# Patient Record
Sex: FEMALE | Race: WHITE | NOT HISPANIC OR LATINO | Employment: FULL TIME | URBAN - METROPOLITAN AREA
[De-identification: names, ages, dates, MRNs, and addresses within clinical notes are randomized per-mention and may not be internally consistent; named-entity substitution may affect disease eponyms.]

---

## 2023-06-05 ENCOUNTER — LAB (OUTPATIENT)
Dept: LAB | Facility: LAB | Age: 38
End: 2023-06-05

## 2023-06-06 LAB — TOBACCO SCREEN, URINE: NEGATIVE

## 2023-08-24 LAB
GENETICS TEST NAME-DATA CONVERSION: NORMAL
LAB MOLECULAR CA TECHNICAL NOTES: NORMAL

## 2023-11-01 PROBLEM — H91.92 HEARING LOSS OF LEFT EAR: Status: ACTIVE | Noted: 2019-02-24

## 2023-11-01 PROBLEM — G43.019 REFRACTORY MIGRAINE WITHOUT AURA: Status: ACTIVE | Noted: 2021-07-20

## 2023-11-01 PROBLEM — F43.9 REACTION TO SEVERE STRESS, UNSPECIFIED: Status: ACTIVE | Noted: 2021-07-20

## 2023-11-01 PROBLEM — J32.9 CHRONIC SINUSITIS: Status: ACTIVE | Noted: 2022-02-07

## 2023-11-01 PROBLEM — F41.9 ANXIETY DISORDER, UNSPECIFIED: Status: ACTIVE | Noted: 2017-04-19

## 2023-11-01 PROBLEM — H47.10 PAPILLEDEMA OF BOTH EYES: Status: ACTIVE | Noted: 2023-11-01

## 2023-11-01 PROBLEM — G43.009 COMMON MIGRAINE WITHOUT AURA: Status: ACTIVE | Noted: 2023-11-01

## 2023-11-01 PROBLEM — E89.40 POSTABLATIVE OVARIAN FAILURE: Status: ACTIVE | Noted: 2023-11-01

## 2023-11-01 PROBLEM — R41.840 INATTENTION: Status: ACTIVE | Noted: 2021-07-20

## 2023-11-01 PROBLEM — F41.1 GAD (GENERALIZED ANXIETY DISORDER): Status: ACTIVE | Noted: 2021-06-03

## 2023-11-01 PROBLEM — E78.5 HYPERLIPIDEMIA: Status: ACTIVE | Noted: 2023-05-01

## 2023-11-01 PROBLEM — H47.333 PSEUDOPAPILLEDEMA OF BOTH OPTIC DISCS: Status: ACTIVE | Noted: 2022-02-07

## 2023-11-01 PROBLEM — F90.0 ADHD (ATTENTION DEFICIT HYPERACTIVITY DISORDER), INATTENTIVE TYPE: Status: ACTIVE | Noted: 2022-02-07

## 2023-11-01 PROBLEM — E55.9 VITAMIN D DEFICIENCY: Status: ACTIVE | Noted: 2023-11-01

## 2023-11-01 RX ORDER — LISDEXAMFETAMINE DIMESYLATE CAPSULES 10 MG/1
1 CAPSULE ORAL DAILY
COMMUNITY
Start: 2023-05-15

## 2023-11-01 RX ORDER — TOPIRAMATE 50 MG/1
1-2 TABLET, FILM COATED ORAL 2 TIMES DAILY
COMMUNITY
End: 2023-11-02 | Stop reason: WASHOUT

## 2023-11-01 RX ORDER — ACETAZOLAMIDE 250 MG/1
250 TABLET ORAL 2 TIMES DAILY
COMMUNITY
Start: 2022-12-31 | End: 2023-11-02 | Stop reason: WASHOUT

## 2023-11-01 RX ORDER — TALC
3-9 POWDER (GRAM) TOPICAL NIGHTLY PRN
COMMUNITY
Start: 2023-05-15

## 2023-11-01 RX ORDER — ASPIRIN 325 MG
50000 TABLET, DELAYED RELEASE (ENTERIC COATED) ORAL
COMMUNITY
Start: 2022-11-12 | End: 2023-11-02 | Stop reason: WASHOUT

## 2023-11-01 RX ORDER — RIMEGEPANT SULFATE 75 MG/75MG
TABLET, ORALLY DISINTEGRATING ORAL
COMMUNITY
Start: 2023-04-07

## 2023-11-01 RX ORDER — BUSPIRONE HYDROCHLORIDE 7.5 MG/1
7.5 TABLET ORAL 2 TIMES DAILY
COMMUNITY
End: 2023-11-02 | Stop reason: WASHOUT

## 2023-11-01 RX ORDER — LISDEXAMFETAMINE DIMESYLATE 30 MG/1
1 CAPSULE ORAL EVERY MORNING
COMMUNITY
Start: 2022-11-14

## 2023-11-01 RX ORDER — ERGOCALCIFEROL 1.25 MG/1
1 CAPSULE ORAL
COMMUNITY
Start: 2023-05-04

## 2023-11-01 RX ORDER — FLUTICASONE PROPIONATE 50 MCG
1 SPRAY, SUSPENSION (ML) NASAL DAILY
COMMUNITY
Start: 2022-11-09 | End: 2023-11-02 | Stop reason: WASHOUT

## 2023-11-01 RX ORDER — ESTRADIOL 0.5 MG/.5G
GEL TOPICAL
COMMUNITY
Start: 2022-11-07 | End: 2023-11-02 | Stop reason: WASHOUT

## 2023-11-01 RX ORDER — TOPIRAMATE 100 MG/1
100 TABLET, FILM COATED ORAL DAILY
COMMUNITY
Start: 2021-06-01 | End: 2023-11-02 | Stop reason: WASHOUT

## 2023-11-01 RX ORDER — BUSPIRONE HYDROCHLORIDE 15 MG/1
15 TABLET ORAL 2 TIMES DAILY
COMMUNITY
Start: 2023-10-02

## 2023-11-01 RX ORDER — RIZATRIPTAN BENZOATE 10 MG/1
10 TABLET ORAL
COMMUNITY
Start: 2021-06-01

## 2023-11-01 RX ORDER — BUSPIRONE HYDROCHLORIDE 10 MG/1
10 TABLET ORAL 2 TIMES DAILY
COMMUNITY
Start: 2023-05-15 | End: 2023-11-02 | Stop reason: WASHOUT

## 2023-11-01 RX ORDER — ATOMOXETINE 40 MG/1
40 CAPSULE ORAL EVERY MORNING
COMMUNITY
End: 2023-11-02 | Stop reason: WASHOUT

## 2023-11-02 ENCOUNTER — TELEMEDICINE CLINICAL SUPPORT (OUTPATIENT)
Dept: GENETICS | Facility: CLINIC | Age: 38
End: 2023-11-02
Payer: COMMERCIAL

## 2023-11-02 DIAGNOSIS — Z13.71 ENCOUNTER FOR NONPROCREATIVE GENETIC COUNSELING AND TESTING: Primary | ICD-10-CM

## 2023-11-02 DIAGNOSIS — Z71.83 ENCOUNTER FOR NONPROCREATIVE GENETIC COUNSELING AND TESTING: Primary | ICD-10-CM

## 2023-11-02 DIAGNOSIS — Z80.41 FAMILY HISTORY OF OVARIAN CANCER: ICD-10-CM

## 2023-11-02 DIAGNOSIS — Z80.3 FAMILY HISTORY OF BREAST CANCER: ICD-10-CM

## 2023-11-02 PROCEDURE — 96040 HC GENETIC COUNSELING, EACH 30 MIN: CPT | Performed by: GENETIC COUNSELOR, MS

## 2023-11-02 PROCEDURE — 96040 PR MEDICAL GENETICS COUNSELING EACH 30 MINUTES: CPT | Performed by: GENETIC COUNSELOR, MS

## 2023-11-02 RX ORDER — SUMATRIPTAN 50 MG/1
TABLET, FILM COATED ORAL
COMMUNITY
Start: 2018-06-22

## 2023-11-02 RX ORDER — TRAZODONE HYDROCHLORIDE 50 MG/1
50 TABLET ORAL NIGHTLY
COMMUNITY

## 2023-11-02 NOTE — PROGRESS NOTES
History of Present Illness:  Izabela Nunes  is a 27 y.o. female with a maternal family history of breast cancer. Ms. Nunes was self-referred to the Cancer Genetics Clinic at [facility] at the suggestion of her daughter's medical geneticist. Ms. Nunes is interested in genetic testing to clarify their personal risk for cancer, as well as the risks to their family members.    Cancer Medical History:  Personal history of cancer? No.    Prior genetic testing? Parental comparison testing as part of [facility] for her child. No hereditary cancer testing.    Cancer screening history:  Mammograms? Yes, 2023.  Breast MRI? Yes, 2023.  PAP smear? Yes, in the past, but not since TH-BSO. No known history of abnormal Pap smears.  Colonoscopy? Unsure. Had procedure involving bowel prep and contrast prior to hysterectomy.   Upper endoscopy? No.  Dermatology? No.   Other cancer screening? No.    Reproductive History:  Number of children: 4.  Number of pregnancies: 8.   Age first birth: 19.  Breast feeding? Yes.  Menarche (age): 8.  Menopause (age): s/p TH-BSO in .  OCP: Yes, for ~9 years. Also used patch for ~9 months  HRT: Yes, for ~3 years. Has also used patch/gel//cream.    Hysterectomy? Yes, in 2016 per patient. S/p TH-BSO due to failed endometrial ablation. History of tubal ligation in , but in , was experiencing heavy bleeding. 2016 underwent endometrial ablation.  Oophorectomy? Yes.    Family history:  A 4-generation pedigree was obtained and was significant for the following:  -Patient, no history of cancer;  -Mother,  at 52 due to breast cancer (diagnosed at 51);  -Maternal aunt, 71, with breast cancer (diagnosed at 68);  -Maternal grandmother,  at 72, with breast cancer (diagnosed at 63);  -Maternal great-uncle,  70s, bladder cancer diagnosed in 50s, non-smoker;  -Maternal great-aunt,  late 50s/early 60s due to breast and ovarian cancer (diagnosed in  50s);  -Paternal great-aunt,  in 40s, due to cancer NOS (diagnosed in 40s);  -Of note, patient's daughter has a neurodevelopmental disorder due to a VAMP2 gene mutation, per the patient.    Ms. Nunes is of Japanese/Montenegrin (maternal) & Danish (paternal) ancestry. There is no known Ashkenazi Christianity ancestry or consanguinity.      Genetic counseling:  Ms. Nunes is a 37 y.o.-year-old female with a maternal family history of breast & ovarian cancer concerning for hereditary cancer.  This could be BRCA1 or BRCA2-related hereditary breast and ovarian cancer (HBOC), or hereditary breast cancer due to a different gene mutation, such as PALB2.  Ms. Nunes meets current national (NCCN) criteria for testing of high-penetrance breast cancer susceptibility genes, including BRCA1, BRCA2, CDH1, PALB2, PTEN, and TP53.  Testing is medically necessary, as it will help determine if Ms. Nunes is a candidate for continued high-risk breast care (yearly mammograms with yearly breast MRIs), as well as other interventions.    We reviewed genes and chromosomes, inherited forms of breast and ovarian cancer, and the BRCA1 and BRCA2 genes causing HBOC.  We discussed that most cancers are not due to an inherited genetic susceptibility.  However, in about 5-10% of families, there is an inherited genetic mutation that can make a person more susceptible to developing certain forms of cancer.  Within these families, we often see multiple family members with cancer, occurring in multiple generations.  In addition, earlier onset and bilateral cancers are suggestive of an inherited form of cancer.  Finally, there is a clustering of certain types of cancer in these families, such as breast and ovarian cancer.    We discussed the BRCA1 and BRCA2 genes, which are two genes that have been linked to early-onset breast and/or ovarian cancer.  Mutations in these genes are inherited in a dominant pattern and confer up to an 87% lifetime risk for breast  cancer.  This is elevated compared to the general population risk of 10-12%.  In addition, BRCA1 and BRCA2 mutation carriers have up to a 45% lifetime risk for ovarian cancer, which is elevated over the 2% general population risk.  Mutation carriers who have already been diagnosed with cancer have an increased risk to develop a second, contralateral breast cancer.  BRCA2 gene mutation carriers have an increased risk for male breast cancer, prostate cancer, melanoma, gastric cancer, and pancreatic cancer.    We briefly discussed that there are multiple genes associated with increased breast and/or ovarian cancer risk. Some genes, like the BRCA genes are considered highly penetrant breast and ovarian cancer genes, meaning a mutation in the gene confers a high risk of breast and/or ovarian cancer. On the other hand, there are other intermediate (moderate risk) breast and ovarian cancer genes. For many of the moderate risk genes, there is sometimes limited information regarding the degree to which a mutation in the gene affects risk of different types of cancers. Additionally, for some of these moderate risk genes, the appropriate management for individuals who have a mutation in one of these genes is not always clear. In many cases, even if an individual tests positive for a mutation in a moderate risk gene, recommendations are still based on the family history, not the positive test result.    Ms. Nunes was counseled about hereditary cancer susceptibility including cancer risks, options for increased screening and/or risk reduction, genetic testing, and the implications for other family members.  We discussed performing testing for high-penetrance breast cancer susceptibility genes, ideally as part of a multi-gene panel.  We specifically discussed the [facility] Common Hereditary Cancers panel, which examines the following 47 genes:  APC, AFUA, AXIN2, BARD1, BMPR1A, BRCA1, BRCA2, BRIP1, CDH1, CDK4, CDKN2A, CHEK2, CTNNA1,  DICER1, EPCAM, GREM1, HOXB13, KIT, MEN1, MLH1, MSH2, MSH3, MSH6, MUTYH, NBN, NF1, NTHL1, PALB2, PDGFRA, PMS2, POLD1, POLE, PTEN, RAD50, RAD51C, RAD51D, SDHA, SDHB, SDHC, SDHD, SMAD4, SMARCA4, STK11, TP53, TSC1, TSC2, and VHL.    We discussed the Genetic Information Nondiscrimination Act (MAC). We discussed the protections and limitations of MAC. MAC generally makes in illegal for health insurance companies, group health plans, and most employers (with >15 employees) to discriminate based on genetic information. It does not protect against genetic discrimination for life insurance, disability insurance, long-term care, or other insurances.    After a discussion about the risks, benefits, and limitations of genetic testing,  Ms. Nunes elected to undergo genetic testing for hereditary cancer using the [facility] panel described above.  She gave her oral consent to proceed with testing.  She plans to have her blood drawn for testing in th near future. Results are typically available within 3-4 weeks from the time of blood draw, and Ms. Nunes will return to the Cancer Genetics Clinic to discuss her testing results.  At that time, we will make recommendations for both Ms. Nunes and her family members in terms of cancer screening and/or cancer risk reduction options.    PLAN:  1. Ms. Nunes elected to undergo genetic testing for hereditary cancer using the [facility] panel described above.  She gave her oral consent to proceed with testing.  She plans to have her blood drawn for testing in th near future. Results are typically available within 3-4 weeks from the time of blood draw.    2.  Ms. Nunes will return to the Cancer Genetics Clinic to discuss her test results.  A follow up appointment has been scheduled for Monday 11/27 at 4 pm, virtual visit.    3. We remain available to Ms. Nunes at [phone] if any questions arise regarding information discussed at today's visit.    Marla Carmichael MS, [facility]  Genetic  Counselor  [facility]  [phone]    Reviewed by:  Chante Rosales MD  Clinical   [facility]  [phone]    Patient seen in conjunction with genetic counseling intern, Jess Phelan.    Time spent with patient:  65 minutes (2:45-3:50 pm)

## 2023-11-02 NOTE — Clinical Note
Please add virtual follow-up to Epic for Monday 11/27 at 4 pm. You will need to open a slot. Thank you!

## 2023-11-06 ENCOUNTER — LAB (OUTPATIENT)
Dept: LAB | Facility: LAB | Age: 38
End: 2023-11-06
Payer: COMMERCIAL

## 2023-11-06 DIAGNOSIS — Z80.41 FAMILY HISTORY OF OVARIAN CANCER: ICD-10-CM

## 2023-11-06 DIAGNOSIS — Z80.3 FAMILY HISTORY OF BREAST CANCER: ICD-10-CM

## 2023-11-06 PROCEDURE — 36415 COLL VENOUS BLD VENIPUNCTURE: CPT

## 2023-11-17 LAB — SCAN RESULT: NORMAL

## 2023-11-27 ENCOUNTER — TELEMEDICINE CLINICAL SUPPORT (OUTPATIENT)
Dept: GENETICS | Facility: CLINIC | Age: 38
End: 2023-11-27
Payer: COMMERCIAL

## 2023-11-27 DIAGNOSIS — Z13.71 BRCA GENE MUTATION NEGATIVE: ICD-10-CM

## 2023-11-27 DIAGNOSIS — Z71.83 ENCOUNTER FOR NONPROCREATIVE GENETIC COUNSELING AND TESTING: ICD-10-CM

## 2023-11-27 DIAGNOSIS — Z13.71 ENCOUNTER FOR NONPROCREATIVE GENETIC COUNSELING AND TESTING: ICD-10-CM

## 2023-11-27 DIAGNOSIS — Z80.3 FAMILY HISTORY OF BREAST CANCER: ICD-10-CM

## 2023-11-27 DIAGNOSIS — Z80.41 FAMILY HISTORY OF OVARIAN CANCER: ICD-10-CM

## 2023-11-27 PROCEDURE — 96040 PR MEDICAL GENETICS COUNSELING EACH 30 MINUTES: CPT | Performed by: GENETIC COUNSELOR, MS

## 2023-11-27 NOTE — PROGRESS NOTES
History of Present Illness:  Izabela Nunes  is a 27 y.o. female with a maternal family history of breast cancer. She was last seen in the Cancer Genetics Clinic on 2023, at which time she chose to pursue testing for hereditary cancer as part of the 47-gene [facility] Common Hereditary Cancers panel. These results returned, and are NEGATIVE (normal). They were reviewed with Ms. Nunes today, and she was provided with a copy of her results via the online [employer] portal, as well as [employer].    Family history (as previously noted):  -Patient, no history of cancer;  -Mother,  at 52 due to breast cancer (diagnosed at 51);  -Maternal aunt, 71, with breast cancer (diagnosed at 68);  -Maternal grandmother,  at 72, with breast cancer (diagnosed at 63);  -Maternal great-uncle,  70s, bladder cancer diagnosed in 50s, non-smoker;  -Maternal great-aunt,  late 50s/early 60s due to breast and ovarian cancer (diagnosed in 50s);  -Paternal great-aunt,  in 40s, due to cancer NOS (diagnosed in 40s);  -Of note, patient's daughter has a neurodevelopmental disorder due to a VAMP2 gene mutation, per the patient.     Ms. Nunes is of Serbian/Eva (maternal) & Kazakh (paternal) ancestry. There is no known Ashkenazi Christian ancestry or consanguinity.     Genetic test results: Negative 47-gene Common Hereditary Cancers panel from [employer], report date 2024. See results at bottom of encounter as well as attached to visit.    Genetic counseling:  Ms. Nunes's results were NEGATIVE, meaning that no disease causing mutations were identified. A genetic cause for her family history of cancer has not been identified.    One reason Ms. Nunes underwent genetic testing was to better understand her risk to develop breast cancer to help determine if further breast screening or surgery is indicated. Because their results were negative, Ms. Nunes does not have an identifiable genetic risk factor that  "places her at an increased risk to develop breast cancer. However, based on her family history, she may still at increased risk for breast cancer (with heterogeneously dense breasts per 5/26/2023 mammogram), though she has multiple mitigating factors (early menopause [31], early age at first birth [20]) that may help reduce her overall risk for breast cancer. Regardless, she is concerned about her risk, and she was therefore offered a referral to the  High Risk Breast Program for consideration of possible high-risk breast care. She accepted this referral, and the referral was placed today. She was asked to call me if she has any difficulty in scheduling this appointment.    It was previously discussed that women with BRCA1 and BRCA2 mutations have an increased risk for ovarian cancer. With negative test results and no family history of ovarian cancer, Ms. Nunes is not considered at increased risk for this cancer type from a genetic standpoint. In addition, she is has already had her uterus and ovaries removed, so she has done what she can do to reduce her risk of gynecologic cancers.    We also discussed that Ms. Nunes should follow their primary care providers' recommendations for all other age-related cancer screenings, such as colonoscopies, etc.    Although her results were negative, Ms. Nunes's maternal relatives are still candidates for genetic testing themselves, as the family history of breast cancer remains unexplained.  There may be a gene mutation, like a BRCA1 or BRCA2 mutation, in the family that Ms. Nunes did not inherit. If her relatives are local, we would be glad to see them here at .  They can call [phone], option 1, to schedule an appointment.  If they live outside the [facility], their relatives can find a genetics specialist in their area by visiting the National Society for Genetic Counselors website at: <http://www.nsgc.org/> under \"Find a Genetic Counselor,\" with \"Cancer\" " specified under special area.     A brief family history of Ms. Nunes children's father was obtained. The only family history of cancer on his side of which she is aware is skin cancer in his father (her children's paternal grandfather). This history is not suggestive of hereditary cancer.  Therefore, no further genetic testing or additional cancer screenings are recommended for their children.    Our understanding of genetic contribution to cancer diagnoses is always evolving, so there may be additional testing recommended in the future. Ms. Nunes was asked to keep us apprised as to any changes to their personal and/or family history of cancer, and to contact us in 2-3 years to determine if there have been any changes since our discussion today.    Marla Carmichael MS, [facility]  Genetic Counselor  [facility]  [phone]    Reviewed by:  Chante Rosales MD  Clinical   [facility]  [phone]    Time spent with patient:  17 minutes (4:04-4:21 pm), virtual visit.

## 2023-12-11 PROBLEM — Z13.71 BRCA NEGATIVE: Status: ACTIVE | Noted: 2023-12-11

## 2023-12-11 PROBLEM — Z12.39 BREAST CANCER SCREENING, HIGH RISK PATIENT: Status: ACTIVE | Noted: 2023-12-11

## 2023-12-11 NOTE — PROGRESS NOTES
[facility]  Izabela Nunes female   1985 38 y.o.   [id number]      Chief Complaint  New patient, high risk evaluation.    History Of Present Illness  Izabela Nunes is a 38 y.o. female presenting to the breast center for high risk evaluation. She denies breast surgery or biopsy. She has family history of breast cancer, see below.    BREAST IMAGING:    REPRODUCTIVE HISTORY: menarche age 8, GP, first birth age, , OCP's, surgical menopause age 32 s/p total hysterectomy, HRT,                            FAMILY CANCER HISTORY:   Mother: Breast cancer, age 51  Maternal Aunt: Breast cancer, age 68  Maternal Grandmother: Breast cancer, age 63    Review of Systems  Constitutional:  Negative for appetite change, fatigue, fever and unexpected weight change.   HENT:  Negative for ear pain, hearing loss, nosebleeds, sore throat and trouble swallowing.    Eyes:  Negative for discharge, itching and visual disturbance.   Breast: As stated in HPI.  Respiratory:  Negative for cough, chest tightness and shortness of breath.    Cardiovascular:  Negative for chest pain, palpitations and leg swelling.   Gastrointestinal:  Negative for abdominal pain, constipation, diarrhea and nausea.   Endocrine: Negative for cold intolerance and heat intolerance.   Genitourinary:  Negative for dysuria, frequency, hematuria, pelvic pain and vaginal bleeding.   Musculoskeletal:  Negative for arthralgias, back pain, gait problem, joint swelling and myalgias.   Skin:  Negative for color change and rash.   Allergic/Immunologic: Negative for environmental allergies and food allergies.   Neurological:  Negative for dizziness, tremors, speech difficulty, weakness, numbness and headaches.   Hematological:  Does not bruise/bleed easily.   Psychiatric/Behavioral:  Negative for agitation, dysphoric mood and sleep disturbance. The patient is not nervous/anxious.       Past Medical History  She has no past medical history  on file.    Surgical History  She has no past surgical history on file.     Family History  Cancer-related family history includes Breast cancer in her maternal grandmother, mother, and mother's sister.     Social History  She has no history on file for tobacco use, alcohol use, and drug use.    Allergies  Latex, natural rubber and Nickel    Medications  Current Outpatient Medications   Medication Instructions    busPIRone (BUSPAR) 15 mg, oral, 2 times daily    ergocalciferol (Vitamin D-2) 1.25 MG (09933 UT) capsule 1 capsule, oral, Weekly    lisdexamfetamine (Vyvanse) 10 MG capsule 1 capsule, oral, Daily    lisdexamfetamine (Vyvanse) 30 mg capsule 1 capsule, oral, Every morning    melatonin 3-9 mg, oral, Nightly PRN    Nurtec ODT 75 mg tablet,disintegrating 1 tablet on the tongue and allow to dissolve Orally for 30 day(s)    rizatriptan (MAXALT) 10 mg, oral, Daily RT    SUMAtriptan (Imitrex) 50 mg tablet Take one tablet at onset of headache. May repeat dose one time in 2 hours if headache not relieved.    traZODone (DESYREL) 50 mg, oral, Nightly       Last Recorded Vitals  There were no vitals filed for this visit.    Physical Exam  Physical Exam  Patient is alert and oriented x3 and in a relaxed and appropriate mood. Her gait is steady and hand grasps are equal. Sclera is clear. The breasts are nearly symmetrical. The tissue is soft without palpable abnormalities, discrete nodules or masses. The skin and nipples appear normal. There is no cervical, supraclavicular or axillary lymphadenopathy. Heart rate and rhythm normal, S1 and S2 appreciated. The lungs are clear to auscultation bilaterally. Abdomen is soft and non-tender.      Orders  No orders of the defined types were placed in this encounter.      Visit Diagnosis  1. Breast cancer screening, high risk patient        2. BRCA negative          Assessment/Plan  High risk surveillance care, normal clinical exam, no breast surgery or biopsy, BRCA negative, family  history of breast cancer      Plan:  Return in November 2024 for bilateral screening mammogram and office visit. Endocrine therapy not recommended d/t low 5 year risk. Full breast MRI May 2024.    Patient Discussion/Summary  Your clinical examination is normal. Please return in November 2024 for mammogram and office visit or sooner if you have any problems or concerns.     High risk breast surveillance care plan:  Yearly mammogram with digital breast tomosynthesis  Twice yearly clinical breast examinations  Breast MRI (to schedule call [phone])  Monthly self breast examinations &/or regular self breast awareness  Vitamin D3 2000 IU/daily (over the counter) unless your PCP recommends you take a specific dose  Exercise 3-4 times per week for 45-60 minutes  Limit alcohol to 3-4 drinks per week if you are menopausal  Eat healthy low-fat diet with lots of vegetable & fruits  Risk models indicate personal risk of breast cancer in the next 5 years and  lifetime (age 85-90):  Breast Cancer Risk Assessment Tool (Viviana): 5-year risk 1.1% (average 0.5%), lifetime risk 20% (average 12.4%).   Tyrer-Cuzick: 5-year risk 1.1% (average 0.6%), lifetime risk 13.8%, (average 10.9%)      IMPORTANT INFORMATION REGARDING YOUR RESULTS    You can see your health information, review clinical summaries from office visits & test results online when you follow your health with MY  Chart, a personal health record. To sign up go to [email or web address]. If you need assistance with signing up or trouble getting into your account call [facility] Patient Line 24/7 at [phone].    My office phone number is [phone] if you need to get in touch with me or have additional questions or concerns. Thank you for choosing [facility] and trusting me as your healthcare provider. I look forward to seeing you again at your next office visit. I am honored to be a provider on your health care team and I remain dedicated to helping you  achieve your health goals.    Bessie Bae, APRN-CNP

## 2023-12-12 ENCOUNTER — APPOINTMENT (OUTPATIENT)
Dept: SURGICAL ONCOLOGY | Facility: CLINIC | Age: 38
End: 2023-12-12
Payer: COMMERCIAL

## 2023-12-13 ENCOUNTER — APPOINTMENT (OUTPATIENT)
Dept: SURGICAL ONCOLOGY | Facility: CLINIC | Age: 38
End: 2023-12-13
Payer: COMMERCIAL

## 2024-01-02 PROBLEM — R92.30 DENSE BREAST TISSUE: Status: ACTIVE | Noted: 2024-01-02

## 2024-01-02 NOTE — PROGRESS NOTES
[facility]  Izabela Nunes female   1985 38 y.o.   [id number]      Chief Complaint  New patient, high risk evaluation.    History Of Present Illness  Izabela Nunes is a very pleasant 38 y.o.  female presenting to the breast center for high risk evaluation. She met with genetics and underwent genetic testing which was negative. She denies breast surgery or biopsy. She underwent a total hysterectomy at the age of 30 for AUB and was on HRT for 7 years. She has been off of it now for the past 2 years. She does have menopausal symptoms, hot flashes and trouble sleeping. She is seeing a gyn at [facility] in [place]. She has family history of breast cancer, see below.     BREAST IMAGIN2023 Bilateral diagnostic mammogram with bilateral ultrasound, indicates BI-RADS Category 2. 2023 Bilateral full breast MRI, indicates BI-RADS Category 2.     REPRODUCTIVE HISTORY: menarche age 8, , first birth age 19,  x 47 months, no OCP's, surgical menopause age 30 s/p total hysterectomy d/t AUB, HRT for 7 years, heterogeneously dense    FAMILY CANCER HISTORY:   Mother: Breast cancer, age 49 (negative genetics)  Maternal Grandmother: Breast cancer, age 54 (negative genetics)  Maternal Aunt: Breast cancer, age 68 (negative genetics)  Maternal Great Aunt: Ovarian and uterine cancer  Maternal Great Grandmother: Cervical cancer    Review of Systems  Constitutional:  Negative for appetite change, fatigue, fever and unexpected weight change.   HENT:  Negative for ear pain, hearing loss, nosebleeds, sore throat and trouble swallowing.    Eyes:  Negative for discharge, itching and visual disturbance.   Breast: As stated in HPI.  Respiratory:  Negative for cough, chest tightness and shortness of breath.    Cardiovascular:  Negative for chest pain, palpitations and leg swelling.   Gastrointestinal:  Negative for abdominal pain, constipation, diarrhea and nausea.   Endocrine: Negative for  cold intolerance and heat intolerance.   Genitourinary:  Negative for dysuria, frequency, hematuria, pelvic pain and vaginal bleeding.   Musculoskeletal:  Negative for arthralgias, back pain, gait problem, joint swelling and myalgias.   Skin:  Negative for color change and rash.   Allergic/Immunologic: Negative for environmental allergies and food allergies.   Neurological:  Negative for dizziness, tremors, speech difficulty, weakness, numbness and headaches.   Hematological:  Does not bruise/bleed easily.   Psychiatric/Behavioral:  Negative for agitation, dysphoric mood and sleep disturbance. The patient is not nervous/anxious.       Past Medical History  She has no past medical history on file.    Surgical History  She has no past surgical history on file.     Family History  Cancer-related family history includes Breast cancer in her maternal grandmother, mother, and mother's sister.     Social History  Social History     Tobacco Use    Smoking status: Former     Types: Cigarettes    Smokeless tobacco: Never   Substance Use Topics    Alcohol use: Not on file      Allergies  Latex, natural rubber and Nickel    Medications  Current Outpatient Medications   Medication Instructions    busPIRone (BUSPAR) 15 mg, oral, 2 times daily    ergocalciferol (Vitamin D-2) 1.25 MG (06929 UT) capsule 1 capsule, oral, Weekly    lisdexamfetamine (Vyvanse) 10 MG capsule 1 capsule, oral, Daily    lisdexamfetamine (Vyvanse) 30 mg capsule 1 capsule, oral, Every morning    melatonin 3-9 mg, oral, Nightly PRN    Nurtec ODT 75 mg tablet,disintegrating 1 tablet on the tongue and allow to dissolve Orally for 30 day(s)    rizatriptan (MAXALT) 10 mg, oral, Daily RT    SUMAtriptan (Imitrex) 50 mg tablet Take one tablet at onset of headache. May repeat dose one time in 2 hours if headache not relieved.    traZODone (DESYREL) 50 mg, oral, Nightly     Last Recorded Vitals  Blood pressure 120/79, pulse 102, weight 70.9 kg (156 lb 6.4  oz).    Physical Exam  Patient is alert and oriented x3 and in a relaxed and appropriate mood. Her gait is steady and hand grasps are equal. Sclera is clear. The breasts are nearly symmetrical. The tissue is soft without palpable abnormalities, discrete nodules or masses. The skin and nipples appear normal. There is no cervical, supraclavicular or axillary lymphadenopathy. Heart rate and rhythm normal, S1 and S2 appreciated. The lungs are clear to auscultation bilaterally. Abdomen is soft and non-tender.      Orders  Orders Placed This Encounter   Procedures    BI mammo bilateral screening tomosynthesis     Standing Status:   Future     Standing Expiration Date:   3/3/2025     Order Specific Question:   Is the patient pregnant?     Answer:   No     Order Specific Question:   Reason for exam:     Answer:   screening mammogram, high risk surveillance care, lifetime risk > 20%     Order Specific Question:   Radiologist to Determine Optimal Study     Answer:   Yes     Order Specific Question:   Release result to exsulin     Answer:   Immediate     Order Specific Question:   Is this exam part of a Research Study? If Yes, link this order to the research study     Answer:   No      Visit Diagnosis  1. Breast cancer screening, high risk patient  Clinic Appointment Request Follow Up      2. Dense breast tissue        3. Family history of breast cancer  Referral to High Risk Breast Cancer      4. Breast cancer screening by mammogram  BI mammo bilateral screening tomosynthesis      5. BRCA negative          Assessment/Plan  High risk surveillance care, normal clinical exam, no breast surgery or biopsy, BRCA negative, family history of breast cancer, heterogeneously dense    Plan:  Return in May 2024 for bilateral screening mammogram and office visit. Due for full breast MRI November 2024. Endocrine therapy not recommended. Gyn referral to [facility] to discuss HRT s/p total hysterectomy. Breast pain education given  and encouraged to try home remedies.     Patient Discussion/Summary  Return in May 2024 for bilateral screening mammogram and office visit Your clinical examination and imaging are normal. Please return in one year for mammogram and office visit or sooner if you have any problems or concerns.     High risk breast surveillance care plan:  Yearly mammogram with digital breast tomosynthesis  Twice yearly clinical breast examinations  Breast MRI (to schedule call [phone])  Monthly self breast examinations &/or regular self breast awareness  Vitamin D3 2000 IU/daily (over the counter) unless your PCP recommends you take a specific dose  Exercise 3-4 times per week for 45-60 minutes  Limit alcohol to 3-4 drinks per week if you are menopausal  Eat healthy low-fat diet with lots of vegetable & fruits  Risk models indicate personal risk of breast cancer in the next 5 years and  lifetime (age 85-90):  Breast Cancer Risk Assessment Tool (Viviana): 5-year risk 1.1% (average 0.5%), lifetime risk 20% (average 12.4%).   Oraer-Doeck: 5-year risk 0.7%% (average 0.6%), lifetime risk 16.3%, (average 13%)      IMPORTANT INFORMATION REGARDING YOUR RESULTS    You can see your health information, review clinical summaries from office visits & test results online when you follow your health with MY  Chart, a personal health record. To sign up go to [email or web address]. If you need assistance with signing up or trouble getting into your account call [employer] Patient Line 24/7 at [phone].    My office phone number is [phone] if you need to get in touch with me or have additional questions or concerns. Thank you for choosing [facility] and trusting me as your healthcare provider. I look forward to seeing you again at your next office visit. I am honored to be a provider on your health care team and I remain dedicated to helping you achieve your health goals.    Bessie Bae, TABITHA-CNP

## 2024-01-03 ENCOUNTER — OFFICE VISIT (OUTPATIENT)
Dept: SURGICAL ONCOLOGY | Facility: CLINIC | Age: 39
End: 2024-01-03
Payer: COMMERCIAL

## 2024-01-03 VITALS
SYSTOLIC BLOOD PRESSURE: 120 MMHG | HEART RATE: 102 BPM | DIASTOLIC BLOOD PRESSURE: 79 MMHG | WEIGHT: 156.4 LBS | BODY MASS INDEX: 30.54 KG/M2

## 2024-01-03 DIAGNOSIS — Z12.31 BREAST CANCER SCREENING BY MAMMOGRAM: ICD-10-CM

## 2024-01-03 DIAGNOSIS — R92.30 DENSE BREAST TISSUE: ICD-10-CM

## 2024-01-03 DIAGNOSIS — Z13.71 BRCA NEGATIVE: ICD-10-CM

## 2024-01-03 DIAGNOSIS — Z80.3 FAMILY HISTORY OF BREAST CANCER: ICD-10-CM

## 2024-01-03 DIAGNOSIS — Z12.39 BREAST CANCER SCREENING, HIGH RISK PATIENT: Primary | ICD-10-CM

## 2024-01-03 PROCEDURE — 99203 OFFICE O/P NEW LOW 30 MIN: CPT | Performed by: NURSE PRACTITIONER

## 2024-01-03 PROCEDURE — 99213 OFFICE O/P EST LOW 20 MIN: CPT | Performed by: NURSE PRACTITIONER

## 2024-01-03 ASSESSMENT — PAIN SCALES - GENERAL: PAINLEVEL: 2

## 2024-05-20 ENCOUNTER — OFFICE VISIT (OUTPATIENT)
Dept: OBSTETRICS AND GYNECOLOGY | Facility: CLINIC | Age: 39
End: 2024-05-20
Payer: COMMERCIAL

## 2024-05-20 VITALS
HEIGHT: 60 IN | WEIGHT: 164.2 LBS | BODY MASS INDEX: 32.24 KG/M2 | DIASTOLIC BLOOD PRESSURE: 70 MMHG | SYSTOLIC BLOOD PRESSURE: 124 MMHG

## 2024-05-20 DIAGNOSIS — Z79.890 MENOPAUSAL SYNDROME ON HORMONE REPLACEMENT THERAPY: Primary | ICD-10-CM

## 2024-05-20 DIAGNOSIS — N95.1 MENOPAUSAL SYNDROME ON HORMONE REPLACEMENT THERAPY: Primary | ICD-10-CM

## 2024-05-20 PROCEDURE — 1036F TOBACCO NON-USER: CPT | Performed by: NURSE PRACTITIONER

## 2024-05-20 PROCEDURE — 99204 OFFICE O/P NEW MOD 45 MIN: CPT | Performed by: NURSE PRACTITIONER

## 2024-05-20 RX ORDER — ESTRADIOL 1 MG/1
1 TABLET ORAL DAILY
Qty: 30 TABLET | Refills: 11 | Status: SHIPPED | OUTPATIENT
Start: 2024-05-20

## 2024-05-20 ASSESSMENT — ENCOUNTER SYMPTOMS
MUSCULOSKELETAL NEGATIVE: 0
CONSTITUTIONAL NEGATIVE: 0
PSYCHIATRIC NEGATIVE: 0
EYES NEGATIVE: 0
HEMATOLOGIC/LYMPHATIC NEGATIVE: 0
CARDIOVASCULAR NEGATIVE: 0
RESPIRATORY NEGATIVE: 0
GASTROINTESTINAL NEGATIVE: 0
NEUROLOGICAL NEGATIVE: 0
ENDOCRINE NEGATIVE: 0
ALLERGIC/IMMUNOLOGIC NEGATIVE: 0

## 2024-05-20 ASSESSMENT — PAIN SCALES - GENERAL: PAINLEVEL: 0-NO PAIN

## 2024-05-20 NOTE — PROGRESS NOTES
Subjective   Patient ID: Izabela Nunes is a 38 y.o. female who presents for No chief complaint on file..  HPI  Concerns:   8 years ago, 2016; hysterectomy d/t AUB-prolonged  Tried ablation but it was ineffective    Menopausal age: unknown d/t hysterectomy  Unsure if she still has her ovaries; the surgical report did not state they were removed  1 year after the surgery developed VMS, mood changes  Follow up US did not see ovaries      Any Contraindications to HT: personal h/o breast cancer, estrogen sensitive cancer, dementia, stroke, MI, VTE or inherited high risk for VTE, SCAD: none  h/o congenital heart disease (increased risk of DVT) none      HT:  premarin 0.625mg  started 2020 until 2021, switched to estradiol gel; 0.5mg divigel until 2023 better than oral but not as good as compounded  Estradiol patches did not adhere to skin  use of OTC remedies: none  bioidenticals: started 2017 until 2020 compounded estradiol, progesterone, testosterone    Vaginal bleeding:  none  VMS: yes  Sleep difficulties: yes even with 50mg trazodone and melatonin  Mood changes: yes even while on buspar  H/o ADHD  Joint pain: yes  Brain fog/difficulty concentrating: yes  Weight gain    GSM:   are you sexually active: no and not bothered by it  Previous h/o dyspareunia    Urinary Incontinence: yes, mixed       Fractures: none  H/O abnormal pap: none    FH:   breast cancer:  mother, aunt  ovarian cancer: great aunt maternal   colon cancer: none  pancreatic cancer: none  Pt had genetic screening that was negative    Exercise:  yes  weight bearing: yes     Review of Systems    Objective   Physical Exam    Assessment/Plan   Diagnoses and all orders for this visit:  Menopausal syndrome on hormone replacement therapy  -     estradiol (Estrace) 1 mg tablet; Take 1 tablet (1 mg) by mouth once daily.       Will start with oral estradiol; follow up in 6 weeks; will consider MP for anxiety and sleep if necessary  At this time there is no  need for testosterone  Discussed with patient that we won't know whether she is menopausal or perimenopausal but d/t her young age she should be on MHT until the average age of menopause for long term bone and heart health in addition to QOL    TABITHA Simmons-CNP 05/20/24 9:04 AM

## 2024-05-28 NOTE — PROGRESS NOTES
[facility]  Izabela Nunes female   1985 38 y.o.   [id number]      Chief Complaint  Annual mammogram and exam, high risk surveillance care.    History Of Present Illness  Izabela Nunes is a very pleasant 38 y.o.  female followed in the breast center for high risk surveillance care. She met with genetics and underwent genetic testing which was negative. She has a history of a benign right breast excisional biopsy. She underwent a total hysterectomy at the age of 30 for AUB and was on HRT for 7 years. She has been off of it now for the past 2 years. She does have menopausal symptoms, hot flashes and trouble sleeping. She just saw Reece Samuel CNP and was started on Estradiol. She has family history of breast cancer, see below.     BREAST IMAGIN2024 Bilateral diagnostic mammogram with bilateral ultrasound, indicates BI-RADS Category 1. 2023 Bilateral full breast MRI, indicates BI-RADS Category 2.     REPRODUCTIVE HISTORY: menarche age 8, , first birth age 19,  x 47 months, no OCP's, surgical menopause age 30 s/p total hysterectomy d/t AUB, HRT for 7 years and on Estradial now, heterogeneously dense    FAMILY CANCER HISTORY:   Mother: Breast cancer, age 49 (negative BRCA testing)  Maternal Grandmother: Breast cancer, age 54 (negative genetics)  Maternal Aunt: Breast cancer, age 68 (negative BRCA testing)  Maternal Great Aunt: Ovarian and uterine cancer  Maternal Great Grandmother: Cervical cancer    Review of Systems  Constitutional:  Negative for appetite change, fatigue, fever and unexpected weight change.   HENT:  Negative for ear pain, hearing loss, nosebleeds, sore throat and trouble swallowing.    Eyes:  Negative for discharge, itching and visual disturbance.   Breast: As stated in HPI.  Respiratory:  Negative for cough, chest tightness and shortness of breath.    Cardiovascular:  Negative for chest pain, palpitations and leg swelling.    Gastrointestinal:  Negative for abdominal pain, constipation, diarrhea and nausea.   Endocrine: Negative for cold intolerance and heat intolerance.   Genitourinary:  Negative for dysuria, frequency, hematuria, pelvic pain and vaginal bleeding. Positive incontinence.  Musculoskeletal:  Positive for joint stiffness/swelling.  Skin:  Negative for color change and rash.   Allergic/Immunologic: Negative for environmental allergies and food allergies.   Neurological:  Negative for dizziness, tremors, speech difficulty, weakness, numbness and headaches.   Hematological:  Does not bruise/bleed easily.   Psychiatric/Behavioral:  Negative for agitation, dysphoric mood. The patient is not nervous/anxious. Positive for sleep disturbances.     Past Medical History  She has a past medical history of BRCA1 negative and BRCA2 negative.    Surgical History  She has a past surgical history that includes Hysterectomy and Breast biopsy (Right, 2009).     Family History  Cancer-related family history includes Breast cancer (age of onset: 50) in her mother; Breast cancer (age of onset: 58) in her maternal grandmother and mother's sister.     Social History  Social History     Tobacco Use    Smoking status: Former     Types: Cigarettes    Smokeless tobacco: Never   Substance Use Topics    Alcohol use: Not on file      Allergies  Latex, natural rubber and Nickel    Medications  Current Outpatient Medications   Medication Instructions    busPIRone (BUSPAR) 15 mg, oral, 2 times daily    ergocalciferol (Vitamin D-2) 1.25 MG (99936 UT) capsule 1 capsule, oral, Once Weekly    estradiol (ESTRACE) 1 mg, oral, Daily    lisdexamfetamine (Vyvanse) 10 MG capsule 1 capsule, oral, Daily    lisdexamfetamine (Vyvanse) 30 mg capsule 1 capsule, oral, Every morning    melatonin 3-9 mg, oral, Nightly PRN    Nurtec ODT 75 mg tablet,disintegrating 1 tablet on the tongue and allow to dissolve Orally for 30 day(s)    rizatriptan (MAXALT) 10 mg, oral, Daily RT     SUMAtriptan (Imitrex) 50 mg tablet Take one tablet at onset of headache. May repeat dose one time in 2 hours if headache not relieved.    traZODone (DESYREL) 50 mg, oral, Nightly     Last Recorded Vitals  Blood pressure 116/78, pulse 85, height 1.524 m (5'), weight 73.2 kg (161 lb 6.4 oz).    Physical Exam  Patient is alert and oriented x3 and in a relaxed and appropriate mood. Her gait is steady and hand grasps are equal. Sclera is clear. The breasts are nearly symmetrical. The tissue is soft without palpable abnormalities, discrete nodules or masses. The skin and nipples appear normal. There is no cervical, supraclavicular or axillary lymphadenopathy. Heart rate and rhythm normal, S1 and S2 appreciated. The lungs are clear to auscultation bilaterally. Abdomen is soft and non-tender.      Relevant Imaging  Study Result    Narrative & Impression   Interpreted By:  Ritu Vargas,   STUDY:  BI MAMMO BILATERAL SCREENING TOMOSYNTHESIS;  5/29/2024 12:14 pm      ACCESSION NUMBER(S):  [id number]      ORDERING CLINICIAN:  WILNER ORELLANA      INDICATION:  Screening. History of a benign right excisional biopsy. Family  history of breast cancer with her mother and maternal grandmother  diagnosed.      COMPARISON:  05/26/2023, 02/20/2022, 03/24/2021      FINDINGS:  2D and tomosynthesis images were reviewed at 1 mm slice thickness.      Density:  There are areas of scattered fibroglandular tissue.      No suspicious masses or calcifications are identified.      IMPRESSION:  No mammographic evidence of malignancy.      BI-RADS CATEGORY:      BI-RADS Category:  1 Negative.  Recommendation:  Annual Screening.  Recommended Date:  1 Year.  Laterality:  Bilateral.      For any future breast imaging appointments, please call [phone]).              Patient is currently being seen in high-risk breast Clinic.      MACRO:  None      Signed by: Ritu Vargas 5/29/2024 12:49 PM       Orders  Orders Placed This Encounter    Procedures    BI mammo bilateral screening tomosynthesis     Standing Status:   Future     Standing Expiration Date:   7/29/2025     Order Specific Question:   Is the patient pregnant?     Answer:   No     Order Specific Question:   Reason for exam:     Answer:   annual screening mammogram     Order Specific Question:   Radiologist to Determine Optimal Study     Answer:   Yes     Order Specific Question:   Release result to MyChart     Answer:   Immediate     Order Specific Question:   Is this exam part of a Research Study? If Yes, link this order to the research study     Answer:   No    MR breast bilateral w contrast full protocol     Standing Status:   Future     Standing Expiration Date:   7/29/2025     Order Specific Question:   Has the Patient had a prior MRI with contrast and had an allergic reaction?     Answer:   No     Order Specific Question:   Reason for exam:     Answer:   high risk surveillance care, dense breast tissue, lifetime risk > 20%     Order Specific Question:   Is the patient pregnant or breast feeding?     Answer:   No     Order Specific Question:   What is the patient's sedation requirement?     Answer:   No Sedation     Order Specific Question:   Does the patient have a Cochlear Implant, Pacemaker, Defibrillator, Pacing Wire, Brain Aneurysm Clip, Implanted Nerve or Bone Graft Simulator, Implanted Breast Tissue Expander, Glucose Monitor or Neulasta Device?     Answer:   No     Order Specific Question:   Radiologist to Determine Optimal Study     Answer:   Yes     Order Specific Question:   Release result to MyChart     Answer:   Immediate     Order Specific Question:   Is this exam part of a Research Study? If Yes, link this order to the research study     Answer:   No      Visit Diagnosis  1. Breast cancer screening, high risk patient  Clinic Appointment Request Follow Up    Clinic Appointment Request Follow Up    BI mammo bilateral screening tomosynthesis    MR breast bilateral w  contrast full protocol      2. BRCA negative        3. Dense breast tissue  Clinic Appointment Request Follow Up    BI mammo bilateral screening tomosynthesis    MR breast bilateral w contrast full protocol          Assessment/Plan  High risk surveillance care, normal clinical exam and imaging, history benign right excisional biopsy, BRCA negative, family history of breast cancer, heterogeneously dense    Plan:  Return in May 2025 for bilateral screening mammogram and office visit. Due for full breast MRI November 2024. Endocrine therapy not recommended, on estradiol s/p total hysterectomy.    Patient Discussion/Summary  Return in May 2025 for bilateral screening mammogram and office visit or sooner if you have any problems or concerns.     IMPORTANT INFORMATION REGARDING YOUR RESULTS    You can see your health information, review clinical summaries from office visits & test results online when you follow your health with MY  Chart, a personal health record. To sign up go to [email or web address]. If you need assistance with signing up or trouble getting into your account call [employer] Patient Line 24/7 at [phone].    My office phone number is [phone] if you need to get in touch with me or have additional questions or concerns. Thank you for choosing [facility] and trusting me as your healthcare provider. I look forward to seeing you again at your next office visit. I am honored to be a provider on your health care team and I remain dedicated to helping you achieve your health goals.    Bessie Bae, APRN-CNP

## 2024-05-29 ENCOUNTER — OFFICE VISIT (OUTPATIENT)
Dept: SURGICAL ONCOLOGY | Facility: CLINIC | Age: 39
End: 2024-05-29
Payer: COMMERCIAL

## 2024-05-29 ENCOUNTER — HOSPITAL ENCOUNTER (OUTPATIENT)
Dept: RADIOLOGY | Facility: CLINIC | Age: 39
Discharge: HOME | End: 2024-05-29
Payer: COMMERCIAL

## 2024-05-29 VITALS — WEIGHT: 164.24 LBS | HEIGHT: 60 IN | BODY MASS INDEX: 32.25 KG/M2

## 2024-05-29 VITALS
HEART RATE: 85 BPM | HEIGHT: 60 IN | WEIGHT: 161.4 LBS | DIASTOLIC BLOOD PRESSURE: 78 MMHG | SYSTOLIC BLOOD PRESSURE: 116 MMHG | BODY MASS INDEX: 31.69 KG/M2

## 2024-05-29 DIAGNOSIS — Z12.39 BREAST CANCER SCREENING, HIGH RISK PATIENT: Primary | ICD-10-CM

## 2024-05-29 DIAGNOSIS — R92.30 DENSE BREAST TISSUE: ICD-10-CM

## 2024-05-29 DIAGNOSIS — Z12.31 BREAST CANCER SCREENING BY MAMMOGRAM: ICD-10-CM

## 2024-05-29 DIAGNOSIS — Z13.71 BRCA NEGATIVE: ICD-10-CM

## 2024-05-29 PROCEDURE — 77067 SCR MAMMO BI INCL CAD: CPT

## 2024-05-29 PROCEDURE — 77067 SCR MAMMO BI INCL CAD: CPT | Performed by: RADIOLOGY

## 2024-05-29 PROCEDURE — 99213 OFFICE O/P EST LOW 20 MIN: CPT | Performed by: NURSE PRACTITIONER

## 2024-05-29 PROCEDURE — 77063 BREAST TOMOSYNTHESIS BI: CPT | Performed by: RADIOLOGY

## 2024-05-29 PROCEDURE — 1036F TOBACCO NON-USER: CPT | Performed by: NURSE PRACTITIONER

## 2024-05-29 ASSESSMENT — PAIN SCALES - GENERAL: PAINLEVEL: 0-NO PAIN

## 2024-07-16 ENCOUNTER — APPOINTMENT (OUTPATIENT)
Dept: OBSTETRICS AND GYNECOLOGY | Facility: CLINIC | Age: 39
End: 2024-07-16
Payer: COMMERCIAL

## 2024-07-16 VITALS
DIASTOLIC BLOOD PRESSURE: 68 MMHG | BODY MASS INDEX: 33.06 KG/M2 | WEIGHT: 168.4 LBS | HEIGHT: 60 IN | SYSTOLIC BLOOD PRESSURE: 120 MMHG

## 2024-07-16 DIAGNOSIS — N95.1 MENOPAUSAL SYNDROME ON HORMONE REPLACEMENT THERAPY: Primary | ICD-10-CM

## 2024-07-16 DIAGNOSIS — Z79.890 MENOPAUSAL SYNDROME ON HORMONE REPLACEMENT THERAPY: Primary | ICD-10-CM

## 2024-07-16 PROCEDURE — 1036F TOBACCO NON-USER: CPT | Performed by: NURSE PRACTITIONER

## 2024-07-16 PROCEDURE — 99213 OFFICE O/P EST LOW 20 MIN: CPT | Performed by: NURSE PRACTITIONER

## 2024-07-16 RX ORDER — FLUTICASONE PROPIONATE 50 MCG
1 SPRAY, SUSPENSION (ML) NASAL AS NEEDED
COMMUNITY
Start: 2022-11-09

## 2024-07-16 RX ORDER — ESTRADIOL 2 MG/1
2 TABLET ORAL DAILY
Qty: 30 TABLET | Refills: 11 | Status: SHIPPED | OUTPATIENT
Start: 2024-07-16 | End: 2025-07-16

## 2024-07-16 ASSESSMENT — ENCOUNTER SYMPTOMS
CONSTITUTIONAL NEGATIVE: 0
GASTROINTESTINAL NEGATIVE: 0
EYES NEGATIVE: 0
MUSCULOSKELETAL NEGATIVE: 0
NEUROLOGICAL NEGATIVE: 0
HEMATOLOGIC/LYMPHATIC NEGATIVE: 0
ENDOCRINE NEGATIVE: 0
CARDIOVASCULAR NEGATIVE: 0
ALLERGIC/IMMUNOLOGIC NEGATIVE: 0
PSYCHIATRIC NEGATIVE: 0
RESPIRATORY NEGATIVE: 0

## 2024-07-16 ASSESSMENT — PAIN SCALES - GENERAL: PAINLEVEL: 0-NO PAIN

## 2024-07-16 NOTE — PROGRESS NOTES
Subjective   Patient ID: Izabela Nunes is a 38 y.o. female who presents for Follow-up.  HPI  Follow up from 5/2024  8 years ago, 2016; hysterectomy d/t AUB-prolonged  Tried ablation but it was ineffective  1 year after the surgery developed VMS, mood changes  Follow up US did not see ovaries  HT:  premarin 0.625mg  started 2020 until 2021, switched to estradiol gel; 0.5mg divigel until 2023 better than oral but not as good as compounded  Estradiol patches did not adhere to skin  bioidenticals: started 2017 until 2020 compounded estradiol, progesterone, testosterone     VMS: yes  Sleep difficulties: yes even with 50mg trazodone and melatonin  Mood changes: yes even while on buspar  H/o ADHD  Joint pain: yes  Brain fog/difficulty concentrating: yes  Weight gain     I prescribed her oral estradiol 1mg    Today she states:  Mixed incontinence resolved  Sleep improved  Mood worsening-rage  Weight gain  VMS, joint pain not improved    Review of Systems    Objective   Physical Exam    Assessment/Plan   Diagnoses and all orders for this visit:  Menopausal syndrome on hormone replacement therapy  -     estradiol (Estrace) 2 mg tablet; Take 1 tablet (2 mg) by mouth once daily.    Decision to increase estradiol from 1mg to 2mg  We also briefly discussed MP  Follow up in 6 weeks       TABITHA Simmons-CNP 07/16/24 11:44 AM

## 2024-08-27 ENCOUNTER — APPOINTMENT (OUTPATIENT)
Dept: OBSTETRICS AND GYNECOLOGY | Facility: CLINIC | Age: 39
End: 2024-08-27
Payer: COMMERCIAL

## 2024-08-27 VITALS
SYSTOLIC BLOOD PRESSURE: 118 MMHG | DIASTOLIC BLOOD PRESSURE: 78 MMHG | HEIGHT: 60 IN | WEIGHT: 164.8 LBS | BODY MASS INDEX: 32.35 KG/M2

## 2024-08-27 DIAGNOSIS — Z79.890 MENOPAUSAL SYNDROME ON HORMONE REPLACEMENT THERAPY: Primary | ICD-10-CM

## 2024-08-27 DIAGNOSIS — N95.1 MENOPAUSAL SYNDROME ON HORMONE REPLACEMENT THERAPY: Primary | ICD-10-CM

## 2024-08-27 DIAGNOSIS — R63.5 WEIGHT GAIN: ICD-10-CM

## 2024-08-27 PROCEDURE — 1036F TOBACCO NON-USER: CPT | Performed by: NURSE PRACTITIONER

## 2024-08-27 PROCEDURE — 3008F BODY MASS INDEX DOCD: CPT | Performed by: NURSE PRACTITIONER

## 2024-08-27 PROCEDURE — 99214 OFFICE O/P EST MOD 30 MIN: CPT | Performed by: NURSE PRACTITIONER

## 2024-08-27 RX ORDER — PROGESTERONE 100 MG/1
100 CAPSULE ORAL DAILY
Qty: 30 CAPSULE | Refills: 11 | Status: SHIPPED | OUTPATIENT
Start: 2024-08-27

## 2024-08-27 ASSESSMENT — ENCOUNTER SYMPTOMS
RESPIRATORY NEGATIVE: 0
ENDOCRINE NEGATIVE: 0
CARDIOVASCULAR NEGATIVE: 0
ALLERGIC/IMMUNOLOGIC NEGATIVE: 0
CONSTITUTIONAL NEGATIVE: 0
HEMATOLOGIC/LYMPHATIC NEGATIVE: 0
NEUROLOGICAL NEGATIVE: 0
EYES NEGATIVE: 0
PSYCHIATRIC NEGATIVE: 0
GASTROINTESTINAL NEGATIVE: 0
MUSCULOSKELETAL NEGATIVE: 0

## 2024-08-27 ASSESSMENT — PAIN SCALES - GENERAL: PAINLEVEL: 0-NO PAIN

## 2024-08-27 NOTE — PROGRESS NOTES
Subjective   Patient ID: Izabela Nunes is a 38 y.o. female who presents for Follow-up (Menopause/).  HPI  Follow up from 7/2024  H/o hysterectomy  I prescribed her 1mg po estradiol:  Mixed incontinence resolved  Sleep improved  Mood worsening-rage  Weight gain  VMS, joint pain not improved    I then changed her prescription to 2mg po estradiol  No longer has incontinence  Moods: depressed, unable to read books d/t depression which she previously loved; short tempered to her   Joint pain improved  VMS: decreased    Previously was on wellbutrin for PPD and PMDD  Now on Buspar  Has not gained any  more weight but unable to lose weight    Review of Systems    Objective   Physical Exam    Assessment/Plan   Diagnoses and all orders for this visit:  Menopausal syndrome on hormone replacement therapy  -     progesterone (Prometrium) 100 mg capsule; Take 1 capsule (100 mg) by mouth once daily. Take at bedtime  Weight gain  -     Referral to Clinical Pharmacy; Future    Will continue with 2mg po estradiol but add in 100mg MP  If she has AE to MP, will try Slynd which has drosperinone which is the progesterone in Yari; sample given with instructions: lot #[id number], expiration 11/2026  We also discussed changing the HT to Yari d/t its indication for PMDD    She will contact me in a week with an update       TABITHA Simmons-CNP 08/27/24 4:04 PM

## 2024-09-09 NOTE — PROGRESS NOTES
"  Patient ID: Izabela Nunes is a 38 y.o. female who presents for No chief complaint on file..    Referring Provider: Reece Samuel  Pt was referred for weight loss     Preferred Pharmacy:    [id number] - [place], [place] - [address] [place] [address]  [address] [place] [address]  [place] [address]  Phone: [phone] Fax: [phone]      Subjective      LMP:  Complete hysterectomy 10 years ago    Movement:   Joint pain? Yes, wrists, pelvis, and shoulders - estradiol/testosterone/progesterone compound x 4 years, in 2020 started estradiol patch- patch didn't stick, tried gel - insurance didn't cover, switches to oral in July 2024.   Strength training? Gym 3x/week - walks x 30 mins and weights.   Recommend 2-3x/week for 20 minutes  Osteopenia or osteoporosis: unsure had been told in the past her \"bones were old\"     Stress Level (rate 1-10): 2 out 10    Depression? Not currently, experience post partum     Sleep:   Quantity/Quality/Regularity? Falls to sleep easily with medication, feels progesterone is helpful, but lots of tossing and turning. Fragmented. 10-10:30pm to 5:30am (~6-7 hours)  Daytime brain fog/memory troubles? Yes and ADHD  Tobacco? No, stopped in 2002  Alcohol? No  THC? No    Patient identified goals:   I want to loose weight so that I can feel healthy  I want my clothes to fit better  Has family history of health problems, wants to avoid.     Onset:   Gained 30 lbs in 1 year with exercising and physical job. Not gaining currently but not losing.     Current challenges:   Doesn't have much of an appetite - eats 3 small meals    How long implementing diet/lifestyle change for weight loss:   Since January of 2024    Disordered eating patterns:   None    Weight Loss Drug Therapy Options Screening:     GLP-1 and Metformin:     Pre-Diabetes/Diabetes? Gestation diabetes - 2014  Lab Results   Component Value Date    HGBA1C 4.9 04/23/2021       Pertinent PMH Review:   PMH of Pancreatitis: No  PMH of Gallbladder disease: " "No  PMH of Delayed Gastric Emptying: No  GI issues? No - stomach upset stomach stomach from onions  Frequency of BM? 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 Yes  Patient was on topiramate \"a long while ago\" - she didn't like that should couldn't drink and had less of an appetite.     Adipex:   Anxiety? Yes  Not appropriate with Vyvanse and history of anxiety (currently on buspar).     Thyroid health:   Lab Results   Component Value Date    TSH 2.17 05/01/2023        HRT  Any Contraindications and/or Cautions to HT: Currently on MHT.     Medications potentially contributing to weight gain:   None    History of Gout? No, stopped soda \"a few months back\" - enjoys sparking water  If yes or high fructose diet (soda, hfcs, agave, fruit juice, etc.) order uric acid level.   Uric acid levels above 5.5 mg/dL are linked to increased risk of metabolic diseases.   Fructose metabolism generates uric acid, signaling to the body to increase storage of fat.   Uric acid decreases nitric oxide, constricting blood vessels and impairing insulin function.   High sodium intake can increase uric acid by signaling dehydration, but staying hydrated dilutes sodiums effect.   Elevated levels increase hunger and impulsivity.     Medications tried/stopped for weight management:    None        Concurrent Chronic Medical Conditions:   Cardiovascular Health  The ASCVD Risk score (Prosper DK, et al., 2019) failed to calculate for the following reasons:    The 2019 ASCVD risk score is only valid for ages 40 to 79    Lab Results   Component Value Date    CHOL 199 05/01/2023     Lab Results   Component Value Date    HDL 71 05/01/2023     Lab Results   Component Value Date    LDLCALC 82 05/01/2023     Lab Results   Component Value Date    TRIG 230 (H) 05/01/2023     No components found for: \"CHOLHDL\" " "  BP Readings from Last 3 Encounters:   08/27/24 118/78   07/16/24 120/68   05/29/24 116/78      Blood Sugar Balance  Lab Results   Component Value Date    GLUCOSE 87 05/01/2023    HGBA1C 4.9 04/23/2021     No results found for: \"LEPTIN\", \"INSULFAST\", \"GLUF\"      Thyroid  Lab Results   Component Value Date    TSH 2.17 05/01/2023     Iron Status  No results found for: \"IRON\", \"TIBC\", \"FERRITIN\"     Kidney Function  Lab Results   Component Value Date    CREATININE 0.6 05/01/2023       Potassium  Lab Results   Component Value Date    K 3.5 05/01/2023        Vitamin D3  Lab Results   Component Value Date    VITD25 25 (L) 05/01/2023       Current Outpatient Medications on File Prior to Visit   Medication Sig Dispense Refill    busPIRone (Buspar) 15 mg tablet Take 1 tablet (15 mg) by mouth twice a day.      ergocalciferol (Vitamin D-2) 1.25 MG (59834 UT) capsule Take 1 capsule (1,250 mcg) by mouth 1 (one) time per week.      estradiol (Estrace) 2 mg tablet Take 1 tablet (2 mg) by mouth once daily. 30 tablet 11    fluticasone (Flonase) 50 mcg/actuation nasal spray Administer 1 spray into each nostril if needed.      lisdexamfetamine (Vyvanse) 10 MG capsule Take 1 capsule (10 mg) by mouth once daily.      lisdexamfetamine (Vyvanse) 30 mg capsule Take 1 capsule (30 mg) by mouth once daily in the morning.      melatonin 3 mg tablet Take 1-3 tablets (3-9 mg) by mouth as needed at bedtime.      Nurtec ODT 75 mg tablet,disintegrating 1 tablet on the tongue and allow to dissolve Orally for 30 day(s)      progesterone (Prometrium) 100 mg capsule Take 1 capsule (100 mg) by mouth once daily. Take at bedtime 30 capsule 11    rizatriptan (Maxalt) 10 mg tablet Take 1 tablet (10 mg) by mouth once daily.      SUMAtriptan (Imitrex) 50 mg tablet Take one tablet at onset of headache. May repeat dose one time in 2 hours if headache not relieved.      traZODone (Desyrel) 50 mg tablet Take 1 tablet (50 mg) by mouth once daily at bedtime.   "     No current facility-administered medications on file prior to visit.        Lifestyle and Nourishment Recommendations  Focus on whole foods as close to nature as possible (less than 5 ingredients on the label)  The order of eating matters during each meal, in order to minimize blood sugar spikes  First, 1/3 to 1/2 of meal as colorful vegetables eaten first  Increase fiber 25-30 grams daily (work up slowly to avoid GI distress)  Goal: Daily bowel movement  Second, eat protein and fat as part of your meal  Increase high quality protein to 25-30 grams per meal along with 2-3 x/week resistance training  May consider 3g/day of Creatine (CreaPure) daily mixed in water to support mood, cognition, muscle mass, and bone mass.    Third, have complex carbohydrates/starches as part of your meal  Consider drinking 1 tbsp of apple cider vinegar in a tall glass of water or as a salad dressing with extra virgin olive oil up to 20 minutes prior to or during a meal.   Helps to reduce blood sugar spikes from complex carbohydrates by up to 30%.   Walk or move for 10 minutes after each meal  Goal: 150-210 min/week of walking (10 minutes x 3 meals per day = 30 minutes/day) and mind/body activities (meditation, mindfulness, journaling, etc.) for a minimum of 2-5 minutes daily to reduce cortisol levels.   Beverages: Focus on water, organic tea (loose leave or in paper, avoid plastic sachets), or sparkling/mineral water (ex. Sp[place]rift, [place])   No more than 1 cup (8oz) of organic coffee daily prior to noon. May consider organic green tea.   Avoid alcohol   Avoid ALL artificial sweeteners   Prioritize 7-8 hours of restful sleep nightly.   Turn off electronics 1 hour prior to bedtime, no electronics in the bedroom.  Establish a regular sleep/wake time (+/- 30 minutes)     Medication and allergy reconciliation completed     Drug Interactions   No significant drug interactions identified    Assessment/Plan   Patients goals:   I want to  loose weight so that I can feel healthy  I want my clothes to fit better  Has family history of health problems, wants to avoid.   Discussed patients history, current medical conditions, medications, as well as current diet and lifestyle in depth.   Patient has been making diet/lifestyle changes for: since January  BMI: 32 (163 lbs)  Concurrent medical conditions: gestational diabetes, hyperlipidemia (had taken last lipid panel not fasting).     LABS  Fasting insulin  Fasting glucose  A1C  TSH    Follow up: 10/10/2024 3pm     Time spent with pt: Total length of time 40 (minutes) of the encounter and more than 50% was spent counseling the patient.    Milagro Jason, Pharm.D, [facility], [facility]  Clinical Pharmacist  Pharmacy Services  [phone]    Continue all meds under the continuation of care with the referring provider and clinical pharmacy team.    Verbal consent to manage patient's drug therapy was obtained from the patient and/or an individual authorized to act on behalf of a patient. They were informed they may decline to participate or withdraw from participation in pharmacy services at any time.

## 2024-09-10 ENCOUNTER — APPOINTMENT (OUTPATIENT)
Dept: PHARMACY | Facility: HOSPITAL | Age: 39
End: 2024-09-10
Payer: COMMERCIAL

## 2024-09-10 DIAGNOSIS — R63.5 WEIGHT GAIN: ICD-10-CM

## 2024-09-12 ENCOUNTER — LAB (OUTPATIENT)
Dept: LAB | Facility: LAB | Age: 39
End: 2024-09-12
Payer: COMMERCIAL

## 2024-09-12 DIAGNOSIS — R63.5 WEIGHT GAIN: ICD-10-CM

## 2024-09-12 LAB
EST. AVERAGE GLUCOSE BLD GHB EST-MCNC: 85 MG/DL
GLUCOSE P FAST SERPL-MCNC: 92 MG/DL (ref 74–99)
HBA1C MFR BLD: 4.6 %
INSULIN P FAST SERPL-ACNC: 7 UIU/ML (ref 3–25)
TSH SERPL DL<=0.05 MIU/L-ACNC: 1.16 MIU/L (ref 0.27–4.2)

## 2024-09-12 PROCEDURE — 83036 HEMOGLOBIN GLYCOSYLATED A1C: CPT

## 2024-09-12 PROCEDURE — 82947 ASSAY GLUCOSE BLOOD QUANT: CPT

## 2024-09-12 PROCEDURE — 36415 COLL VENOUS BLD VENIPUNCTURE: CPT

## 2024-09-12 PROCEDURE — 84443 ASSAY THYROID STIM HORMONE: CPT

## 2024-09-12 PROCEDURE — 83525 ASSAY OF INSULIN: CPT

## 2024-09-24 ENCOUNTER — ANCILLARY PROCEDURE (OUTPATIENT)
Dept: URGENT CARE | Age: 39
End: 2024-09-24
Payer: COMMERCIAL

## 2024-09-24 ENCOUNTER — OFFICE VISIT (OUTPATIENT)
Dept: URGENT CARE | Age: 39
End: 2024-09-24
Payer: COMMERCIAL

## 2024-09-24 VITALS
HEART RATE: 78 BPM | RESPIRATION RATE: 16 BRPM | OXYGEN SATURATION: 98 % | TEMPERATURE: 98.3 F | DIASTOLIC BLOOD PRESSURE: 81 MMHG | SYSTOLIC BLOOD PRESSURE: 124 MMHG

## 2024-09-24 DIAGNOSIS — M25.572 LEFT ANKLE PAIN, UNSPECIFIED CHRONICITY: Primary | ICD-10-CM

## 2024-09-24 ASSESSMENT — PAIN SCALES - GENERAL: PAINLEVEL: 4

## 2024-09-24 NOTE — PROGRESS NOTES
Subjective   Patient ID: Izabela Nunes is a 38 y.o. female. They present today with a chief complaint of Injury (Yesterday fell and left ankle popped and twisted. Swelled immediately. Wearing brace.).    History of Present Illness  Patient is a pleasant 88-year-old white female, no significant past medical history, presenting to clinic with complaint of left ankle injury.  Patient states she stood up from a chair and when she pivoted on her left foot her ankle rolled causing an injury.  She is presenting to the clinic today out of concern for pain over the anterior lateral aspect of the left ankle.  She does endorse some bruising.  States he took some Tylenol and applied an ankle brace with some relief.  She has been able to ambulate however with a moderately antalgic gait.  Denies any pain in the foot.  No pain in the shin or knee.  No further complaints.      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 Her smoking use included cigarettes. She has never used smokeless tobacco.    Review of Systems  Review of Systems            All review of systems negative unless stated in HPI.                    Objective    Vitals:    09/24/24 1823   BP: 124/81   Pulse: 78   Resp: 16   Temp: 36.8 °C (98.3 °F)   TempSrc: Oral   SpO2: 98%     No LMP recorded. Patient has had a hysterectomy.    Physical Exam  Gen.: Vitals noted. No distress. Afebrile.     Neck: Supple. No adenopathy.     Cardiac: Regular rate rhythm. No murmur.     Pulmonary: Equal breath sounds bilaterally. No  adventitious breath sounds.     Abdomen: Soft, nontender, nonsurgical. Normoactive bowel sounds.     Back: Nontender throughout.     Left lower extremity: There is no tenderness over the medial malleolus. There is no tenderness over the lateral malleolus. There is no mild tenderness over the anterior ankle mortise with a small amount of soft tissue swelling and slight ecchymosis. The foot is nontender. The skin is intact. Is neurovascularly intact distally. The remainder of the extremity is nontender, specifically, nontender over the knee and fibular head.     Procedures    Point of Care Test & Imaging Results from this visit    XR ankle left 3+ views    Result Date: 9/24/2024  Interpreted By:  Go Rios, STUDY: XR ANKLE LEFT 3+ VIEWS; ;  9/24/2024 6:37 pm   INDICATION: Signs/Symptoms:LEFT ANKLE PAIN.   ,M25.572 Pain in left ankle and joints of left foot   COMPARISON: None.   ACCESSION NUMBER(S): [id number]   ORDERING CLINICIAN: JOSE SORIANO   FINDINGS: No acute fracture or dislocation. The soft tissues are unremarkable. The joint spaces are maintained.       No acute fracture or dislocation.     MACRO: None   Signed by: Go Rios 9/24/2024 6:39 PM Dictation workstation:   [id number]     Diagnostic study results (if any) were reviewed by Jose Soriano PA-C.    Assessment/Plan   Allergies, medications, history, and pertinent labs/EKGs/Imaging reviewed by Jose Soriano PA-C.     Medical Decision Making  Patient was seen eval in the clinic with complaint of left ankle injury.  On exam patient is nontoxic well-appearing respect comfortably no acute distress.  Vital signs are stable, afebrile.  Chest is clear, heart is regular, belly soft and nontender.  Valuation of left ankle as above concerning for a sprain versus fracture.  X-ray imaging of the left ankle was obtained which reveals No underlying acute osseous evaluation of the left ankle.  Likely sustained a sprain.  Advise she  continue to wear her ankle brace at home ice elevate use ibuprofen and Tylenol for 6 hours as needed for pain.  Advise follow-up with primary care physician in the next week.  I reviewed my impression, plan, strict return precautions with the patient.  She expresses understanding and agreement plan of care.    Orders and Diagnoses  Diagnoses and all orders for this visit:  Left ankle pain, unspecified chronicity  -     XR ankle left 3+ views        Medical Admin Record      Follow Up Instructions  No follow-ups on file.    Patient disposition: Home    Electronically signed by Jose Soriano PA-C  6:42 PM

## 2024-09-24 NOTE — LETTER
September 24, 2024     Patient: Izabela Nunes   YOB: 1985   Date of Visit: 9/24/2024       To Whom It May Concern:    It is my medical opinion that Izabela Nunes may return to work on 09/26/2024 .    If you have any questions or concerns, please don't hesitate to call.         Sincerely,        Jose Soriano PA-C    CC: No Recipients

## 2024-10-08 NOTE — PROGRESS NOTES
"  Patient ID: Izabela Nunes is a 38 y.o. female who presents for No chief complaint on file..    Referring Provider: Reece Samuel  Pt was referred for weight loss     Preferred Pharmacy:    [id number] - [place], [place] - [address] [place] [address]  [address] [place] [address]  [place] [address]  Phone: [phone] Fax: [phone]    [facility]  [address], [address]  [facility] [address]  Phone: [phone] Fax: [phone]      Subjective      LMP:  Complete hysterectomy 10 years ago    Movement:   Joint pain? Yes, wrists, pelvis, and shoulders - estradiol/testosterone/progesterone compound x 4 years, in 2020 started estradiol patch- patch didn't stick, tried gel - insurance didn't cover, switches to oral in July 2024.   Strength training? Gym 3x/week - walks x 30 mins and weights.   Recommend 2-3x/week for 20 minutes  Osteopenia or osteoporosis: unsure had been told in the past her \"bones were old\"     Stress Level (rate 1-10): 2 out 10    Depression? Not currently, experience post partum     Sleep:   Quantity/Quality/Regularity? Falls to sleep easily with medication, feels progesterone is helpful, but lots of tossing and turning. Fragmented. 10-10:30pm to 5:30am (~6-7 hours)  Daytime brain fog/memory troubles? Yes and ADHD  Tobacco? No, stopped in 2002  Alcohol? No  THC? No    Patient identified goals:   I want to loose weight so that I can feel healthy  I want my clothes to fit better  Has family history of health problems, wants to avoid.     Onset:   Gained 30 lbs in 1 year with exercising and physical job. Not gaining currently but not losing.     Current challenges:   Doesn't have much of an appetite - eats 3 small meals    How long implementing diet/lifestyle change for weight loss:   Since January of 2024    Disordered eating patterns:   None    Weight Loss Drug Therapy Options Screening:     GLP-1 and Metformin:     Pre-Diabetes/Diabetes? Gestation diabetes - 2014  Lab Results   Component " "Value Date    INSULFAST 7 09/12/2024    GLUF 92 09/12/2024    HGBA1C 4.6 09/12/2024       Pertinent PMH Review:   PMH of Pancreatitis: No  PMH of Gallbladder disease: No  PMH of Delayed Gastric Emptying: No  GI issues? No - stomach upset stomach stomach from onions  Frequency of BM? 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 Yes  Patient was on topiramate \"a long while ago\" - she didn't like that should couldn't drink and had less of an appetite.     Adipex:   Anxiety? Yes  Not appropriate with Vyvanse and history of anxiety (currently on buspar).     Thyroid health:   Lab Results   Component Value Date    TSH 1.16 09/12/2024        HRT  Any Contraindications and/or Cautions to HT: Currently on MHT.     Medications potentially contributing to weight gain:   None    History of Gout? No, stopped soda \"a few months back\" - enjoys sparking water  If yes or high fructose diet (soda, hfcs, agave, fruit juice, etc.) order uric acid level.   Uric acid levels above 5.5 mg/dL are linked to increased risk of metabolic diseases.   Fructose metabolism generates uric acid, signaling to the body to increase storage of fat.   Uric acid decreases nitric oxide, constricting blood vessels and impairing insulin function.   High sodium intake can increase uric acid by signaling dehydration, but staying hydrated dilutes sodiums effect.   Elevated levels increase hunger and impulsivity.     Medications tried/stopped for weight management:    None        Concurrent Chronic Medical Conditions:   Cardiovascular Health  The ASCVD Risk score (Prosper SALCIDO, et al., 2019) failed to calculate for the following reasons:    The 2019 ASCVD risk score is only valid for ages 40 to 79    Lab Results   Component Value Date    CHOL 199 05/01/2023     Lab Results   Component Value Date    HDL 71 05/01/2023 " "    Lab Results   Component Value Date    LDLCALC 82 05/01/2023     Lab Results   Component Value Date    TRIG 230 (H) 05/01/2023     No components found for: \"CHOLHDL\"   BP Readings from Last 3 Encounters:   09/24/24 124/81   08/27/24 118/78   07/16/24 120/68      Blood Sugar Balance  Lab Results   Component Value Date    GLUCOSE 87 05/01/2023    HGBA1C 4.6 09/12/2024    HGBA1C 4.9 04/23/2021     Lab Results   Component Value Date    INSULFAST 7 09/12/2024    GLUF 92 09/12/2024         Thyroid  Lab Results   Component Value Date    TSH 1.16 09/12/2024     Iron Status  No results found for: \"IRON\", \"TIBC\", \"FERRITIN\"     Kidney Function  Lab Results   Component Value Date    CREATININE 0.6 05/01/2023       Potassium  Lab Results   Component Value Date    K 3.5 05/01/2023        Vitamin D3  Lab Results   Component Value Date    VITD25 25 (L) 05/01/2023       Current Outpatient Medications on File Prior to Visit   Medication Sig Dispense Refill    busPIRone (Buspar) 15 mg tablet Take 1 tablet (15 mg) by mouth 3 times a day.      estradiol (Estrace) 2 mg tablet Take 1 tablet (2 mg) by mouth once daily. 30 tablet 11    fluticasone (Flonase) 50 mcg/actuation nasal spray Administer 1 spray into each nostril if needed.      lisdexamfetamine (Vyvanse) 10 MG capsule Take 1 capsule (10 mg) by mouth once daily.      lisdexamfetamine (Vyvanse) 30 mg capsule Take 1 capsule (30 mg) by mouth once daily in the morning.      melatonin 3 mg tablet Take 1-3 tablets (3-9 mg) by mouth as needed at bedtime.      Nurtec ODT 75 mg tablet,disintegrating 1 tablet on the tongue and allow to dissolve Orally for 30 day(s)      progesterone (Prometrium) 100 mg capsule Take 1 capsule (100 mg) by mouth once daily. Take at bedtime 30 capsule 11    rizatriptan (Maxalt) 10 mg tablet Take 1 tablet (10 mg) by mouth once daily.      SUMAtriptan (Imitrex) 50 mg tablet Take one tablet at onset of headache. May repeat dose one time in 2 hours if headache " not relieved.      traZODone (Desyrel) 50 mg tablet Take 2 tablets (100 mg) by mouth once daily at bedtime.       No current facility-administered medications on file prior to visit.        Lifestyle and Nourishment Recommendations  Focus on whole foods as close to nature as possible (less than 5 ingredients on the label)  The order of eating matters during each meal, in order to minimize blood sugar spikes  First, 1/3 to 1/2 of meal as colorful vegetables eaten first  Increase fiber 25-30 grams daily (work up slowly to avoid GI distress)  Goal: Daily bowel movement  Second, eat protein and fat as part of your meal  Increase high quality protein to 25-30 grams per meal along with 2-3 x/week resistance training  May consider 3g/day of Creatine (CreaPure) daily mixed in water to support mood, cognition, muscle mass, and bone mass.    Third, have complex carbohydrates/starches as part of your meal  Consider drinking 1 tbsp of apple cider vinegar in a tall glass of water or as a salad dressing with extra virgin olive oil up to 20 minutes prior to or during a meal.   Helps to reduce blood sugar spikes from complex carbohydrates by up to 30%.   Walk or move for 10 minutes after each meal  Goal: 150-210 min/week of walking (10 minutes x 3 meals per day = 30 minutes/day) and mind/body activities (meditation, mindfulness, journaling, etc.) for a minimum of 2-5 minutes daily to reduce cortisol levels.   Beverages: Focus on water, organic tea (loose leave or in paper, avoid plastic sachets), or sparkling/mineral water (ex. Spindrift, Adelina)   No more than 1 cup (8oz) of organic coffee daily prior to noon. May consider organic green tea.   Avoid alcohol   Avoid ALL artificial sweeteners   Prioritize 7-8 hours of restful sleep nightly.   Turn off electronics 1 hour prior to bedtime, no electronics in the bedroom.  Establish a regular sleep/wake time (+/- 30 minutes)     Medication and allergy reconciliation completed     Drug  "Interactions   No significant drug interactions identified    Assessment/Plan   Patients goals:   I want to loose weight so that I can feel healthy  I want my clothes to fit better  Has family history of health problems, wants to avoid.   Discussed patients history, current medical conditions, medications, as well as current diet and lifestyle in depth.   Patient has been making diet/lifestyle changes for: since January  BMI: 32 (163 lbs)  Hysterectomy (2015-16)  Concurrent medical conditions: gestational diabetes 3 out of 4 children, hyperlipidemia (had taken last lipid panel not fasting).   JOHN-IR: 1.6  A1C: 4.6%  Currently BM every other day.   Recommend starting magnesium citrate capsules 2-3 at bedtime  Patient has a history of migraine   Patient was on topiramate \"a long while ago\"     GLP-1 Education    GLP-1 medications work by stimulating insulin release from the pancreas, slowing gastric emptying, inhibiting post meal glucagon release, and reducing appetite.     Discussed benefits of GLP-1 including lowering blood sugar, blood pressure, improving lipid profile, improving fatty liver disease, reducing the risk of heart disease and kidney disease, weight loss, and delaying the progression of diabetes-related nephropathy.     40-50% of weight loss may come from lean muscle mass. It is very important to continue regular exercise including activities that maintain/build muscle mass while taking this medication.     Side effects could include but are not limited to low blood sugar (hypoglycemia), loss of appetite, nausea, vomiting, diarrhea, and delayed gastric emptying.     Encouraged smaller meals and avoiding high fat meals to minimize nausea.   Discussed treating hypoglycemia with 15 grams of carbohydrates (1/2 glass of juice, piece of fruit, 3-4 glucose tablets)    Rare but serious side effects could include but are not limited to gallbladder disease, pancreatitis, medullary thyroid cancer, acute kidney " injury, worsening diabetes related retinopathy, gastroparesis, and bowel obstruction.     If you would experience increased depression or suicidal thoughts while taking this medication contact your PCP immediately.     Zepbound Education:     Counseled patient on Zepbound MOA, expectations, side effects, duration of therapy, administration, and monitoring parameters.  Provided detailed dosing and administration counseling to ensure proper technique.   Reviewed Zepbound titration schedule, starting with 2.5 mg once weekly to a goal of 15 mg once weekly if tolerated  Counseled patient on the benefits of GLP-1ra glycemic control and weight loss  Reviewed storage requirements of Zepbound when not in use, and when to administer the medication if a dose is missed.  Advised patient that they may experience improved satiety after meals and portion sizes of meals may be reduced as doses of Zepound increase.    START  Zepbound 2.5mg once weekly   -Aida has submitted a PA    Follow up: 10/10/2024 3pm     Time spent with pt: Total length of time 30 (minutes) of the encounter and more than 50% was spent counseling the patient.    Milagro Jason, Pharm.D, [facility], [facility]  Clinical Pharmacist  Pharmacy Services  [phone]    Continue all meds under the continuation of care with the referring provider and clinical pharmacy team.    Verbal consent to manage patient's drug therapy was obtained from the patient and/or an individual authorized to act on behalf of a patient. They were informed they may decline to participate or withdraw from participation in pharmacy services at any time.

## 2024-10-10 ENCOUNTER — APPOINTMENT (OUTPATIENT)
Dept: PHARMACY | Facility: HOSPITAL | Age: 39
End: 2024-10-10
Payer: COMMERCIAL

## 2024-10-10 DIAGNOSIS — R63.5 WEIGHT GAIN: Primary | ICD-10-CM

## 2024-10-23 ENCOUNTER — APPOINTMENT (OUTPATIENT)
Dept: PHARMACY | Facility: HOSPITAL | Age: 39
End: 2024-10-23
Payer: COMMERCIAL

## 2024-10-23 DIAGNOSIS — R63.5 WEIGHT GAIN: ICD-10-CM

## 2024-10-23 DIAGNOSIS — G43.019 REFRACTORY MIGRAINE WITHOUT AURA: Primary | ICD-10-CM

## 2024-10-23 PROCEDURE — RXMED WILLOW AMBULATORY MEDICATION CHARGE

## 2024-10-23 RX ORDER — TOPIRAMATE 25 MG/1
25 TABLET ORAL DAILY
Qty: 90 TABLET | Refills: 3 | Status: SHIPPED | OUTPATIENT
Start: 2024-10-23 | End: 2025-10-23

## 2024-10-23 NOTE — PROGRESS NOTES
"  Patient ID: Izabela Nunes is a 38 y.o. female who presents for No chief complaint on file..    Referring Provider: Reece Samuel  Pt was referred for weight loss     Preferred Pharmacy:    [id number] - [place], [place] - [address] [address]  [address] [address]  [place] [address]  Phone: [phone] Fax: [phone]    [facility]  [address], [address]  [facility] [address]  Phone: [phone] Fax: [phone]      Subjective      LMP:  Complete hysterectomy 10 years ago    Movement:   Joint pain? Yes, wrists, pelvis, and shoulders - estradiol/testosterone/progesterone compound x 4 years, in 2020 started estradiol patch- patch didn't stick, tried gel - insurance didn't cover, switches to oral in July 2024.   Strength training? Gym 3x/week - walks x 30 mins and weights.   Recommend 2-3x/week for 20 minutes  Osteopenia or osteoporosis: unsure had been told in the past her \"bones were old\"     Stress Level (rate 1-10): 2 out 10    Depression? Not currently, experience post partum     Sleep:   Quantity/Quality/Regularity? Falls to sleep easily with medication, feels progesterone is helpful, but lots of tossing and turning. Fragmented. 10-10:30pm to 5:30am (~6-7 hours)  Daytime brain fog/memory troubles? Yes and ADHD  Tobacco? No, stopped in 2002  Alcohol? No  THC? No    Patient identified goals:   I want to loose weight so that I can feel healthy  I want my clothes to fit better  Has family history of health problems, wants to avoid.     Onset:   Gained 30 lbs in 1 year with exercising and physical job. Not gaining currently but not losing.     Current challenges:   Doesn't have much of an appetite - eats 3 small meals    How long implementing diet/lifestyle change for weight loss:   Since January of 2024    Disordered eating patterns:   None    Weight Loss Drug Therapy Options Screening:     GLP-1 and Metformin:     Pre-Diabetes/Diabetes? Gestation diabetes - 2014  Lab Results   Component " "Value Date    INSULFAST 7 09/12/2024    GLUF 92 09/12/2024    HGBA1C 4.6 09/12/2024       Pertinent PMH Review:   PMH of Pancreatitis: No  PMH of Gallbladder disease: No  PMH of Delayed Gastric Emptying: No  GI issues? No - stomach upset stomach stomach from onions  Frequency of BM? 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 Yes  Patient was on topiramate \"a long while ago\" - she didn't like that should couldn't drink and had less of an appetite.     Adipex:   Anxiety? Yes  Not appropriate with Vyvanse and history of anxiety (currently on buspar).     Thyroid health:   Lab Results   Component Value Date    TSH 1.16 09/12/2024        HRT  Any Contraindications and/or Cautions to HT: Currently on MHT.     Medications potentially contributing to weight gain:   None    History of Gout? No, stopped soda \"a few months back\" - enjoys sparking water  If yes or high fructose diet (soda, hfcs, agave, fruit juice, etc.) order uric acid level.   Uric acid levels above 5.5 mg/dL are linked to increased risk of metabolic diseases.   Fructose metabolism generates uric acid, signaling to the body to increase storage of fat.   Uric acid decreases nitric oxide, constricting blood vessels and impairing insulin function.   High sodium intake can increase uric acid by signaling dehydration, but staying hydrated dilutes sodiums effect.   Elevated levels increase hunger and impulsivity.     Medications tried/stopped for weight management:    None        Concurrent Chronic Medical Conditions:   Cardiovascular Health  The ASCVD Risk score (Prosper SALCIDO, et al., 2019) failed to calculate for the following reasons:    The 2019 ASCVD risk score is only valid for ages 40 to 79    Lab Results   Component Value Date    CHOL 199 05/01/2023     Lab Results   Component Value Date    HDL 71 05/01/2023 " "    Lab Results   Component Value Date    LDLCALC 82 05/01/2023     Lab Results   Component Value Date    TRIG 230 (H) 05/01/2023     No components found for: \"CHOLHDL\"   BP Readings from Last 3 Encounters:   09/24/24 124/81   08/27/24 118/78   07/16/24 120/68      Blood Sugar Balance  Lab Results   Component Value Date    GLUCOSE 87 05/01/2023    HGBA1C 4.6 09/12/2024    HGBA1C 4.9 04/23/2021     Lab Results   Component Value Date    INSULFAST 7 09/12/2024    GLUF 92 09/12/2024         Thyroid  Lab Results   Component Value Date    TSH 1.16 09/12/2024     Iron Status  No results found for: \"IRON\", \"TIBC\", \"FERRITIN\"     Kidney Function  Lab Results   Component Value Date    CREATININE 0.6 05/01/2023       Potassium  Lab Results   Component Value Date    K 3.5 05/01/2023        Vitamin D3  Lab Results   Component Value Date    VITD25 25 (L) 05/01/2023       Current Outpatient Medications on File Prior to Visit   Medication Sig Dispense Refill    busPIRone (Buspar) 15 mg tablet Take 1 tablet (15 mg) by mouth 3 times a day.      estradiol (Estrace) 2 mg tablet Take 1 tablet (2 mg) by mouth once daily. 30 tablet 11    fluticasone (Flonase) 50 mcg/actuation nasal spray Administer 1 spray into each nostril if needed.      lisdexamfetamine (Vyvanse) 10 MG capsule Take 1 capsule (10 mg) by mouth once daily.      lisdexamfetamine (Vyvanse) 30 mg capsule Take 1 capsule (30 mg) by mouth once daily in the morning.      melatonin 3 mg tablet Take 1-3 tablets (3-9 mg) by mouth as needed at bedtime.      Nurtec ODT 75 mg tablet,disintegrating 1 tablet on the tongue and allow to dissolve Orally for 30 day(s)      progesterone (Prometrium) 100 mg capsule Take 1 capsule (100 mg) by mouth once daily. Take at bedtime 30 capsule 11    rizatriptan (Maxalt) 10 mg tablet Take 1 tablet (10 mg) by mouth once daily.      SUMAtriptan (Imitrex) 50 mg tablet Take one tablet at onset of headache. May repeat dose one time in 2 hours if headache " not relieved.      tirzepatide, weight loss, (Zepbound) 2.5 mg/0.5 mL injection Inject 2.5 mg under the skin every 7 days. 2 mL 11    traZODone (Desyrel) 50 mg tablet Take 2 tablets (100 mg) by mouth once daily at bedtime.       No current facility-administered medications on file prior to visit.        Lifestyle and Nourishment Recommendations  Focus on whole foods as close to nature as possible (less than 5 ingredients on the label)  The order of eating matters during each meal, in order to minimize blood sugar spikes  First, 1/3 to 1/2 of meal as colorful vegetables eaten first  Increase fiber 25-30 grams daily (work up slowly to avoid GI distress)  Goal: Daily bowel movement  Second, eat protein and fat as part of your meal  Increase high quality protein to 25-30 grams per meal along with 2-3 x/week resistance training  May consider 3g/day of Creatine (CreaPure) daily mixed in water to support mood, cognition, muscle mass, and bone mass.    Third, have complex carbohydrates/starches as part of your meal  Consider drinking 1 tbsp of apple cider vinegar in a tall glass of water or as a salad dressing with extra virgin olive oil up to 20 minutes prior to or during a meal.   Helps to reduce blood sugar spikes from complex carbohydrates by up to 30%.   Walk or move for 10 minutes after each meal  Goal: 150-210 min/week of walking (10 minutes x 3 meals per day = 30 minutes/day) and mind/body activities (meditation, mindfulness, journaling, etc.) for a minimum of 2-5 minutes daily to reduce cortisol levels.   Beverages: Focus on water, organic tea (loose leave or in paper, avoid plastic sachets), or sparkling/mineral water (ex. Spindrift, Adelina)   No more than 1 cup (8oz) of organic coffee daily prior to noon. May consider organic green tea.   Avoid alcohol   Avoid ALL artificial sweeteners   Prioritize 7-8 hours of restful sleep nightly.   Turn off electronics 1 hour prior to bedtime, no electronics in the  "bedroom.  Establish a regular sleep/wake time (+/- 30 minutes)     Medication and allergy reconciliation completed     Drug Interactions   No significant drug interactions identified    Assessment/Plan   Patients goals:   I want to loose weight so that I can feel healthy  I want my clothes to fit better  Has family history of health problems, wants to avoid.   Discussed patients history, current medical conditions, medications, as well as current diet and lifestyle in depth.   Patient has been making diet/lifestyle changes for: since January  BMI: 32 (163 lbs)  Hysterectomy (2015-16)  Concurrent medical conditions: gestational diabetes 3 out of 4 children, hyperlipidemia (had taken last lipid panel not fasting).   JOHN-IR: 1.6  A1C: 4.6%  Currently BM every other day.   Recommend starting magnesium citrate capsules 2-3 at bedtime  Patient has a history of migraine   Patient was on topiramate \"a long while ago\"   Patient reports under stress she experiences strong cravings and binge eatings.   Medicaid denied PA for Zepbound, she would like to try Topiramate to address headaches as well as binge eating/weight loss.   Provided patient education including administration, dosing, adverse effects, and cautions.     START  Topiramate 25mg once daily at bedtime    Follow up: 11/19/24 3pm     Time spent with pt: Total length of time 30 (minutes) of the encounter and more than 50% was spent counseling the patient.    Milagro Jason, Pharm.D, [facility], [facility]  Clinical Pharmacist  Pharmacy Services  [phone]    Continue all meds under the continuation of care with the referring provider and clinical pharmacy team.    Verbal consent to manage patient's drug therapy was obtained from the patient and/or an individual authorized to act on behalf of a patient. They were informed they may decline to participate or withdraw from participation in pharmacy services at any time.  "

## 2024-10-25 ENCOUNTER — PHARMACY VISIT (OUTPATIENT)
Dept: PHARMACY | Facility: CLINIC | Age: 39
End: 2024-10-25
Payer: MEDICAID

## 2024-11-18 NOTE — PROGRESS NOTES
"  Patient ID: Izabela Nunes is a 39 y.o. female who presents for No chief complaint on file..    Referring Provider: Reece Samuel  Pt was referred for weight loss     Preferred Pharmacy:    [id number] - [place], [place] - [address] [place] [address]  [address] [place] [address]  [place] [address]  Phone: [phone] Fax: [phone]    [facility]  [address], [address]  [facility] [address]  Phone: [phone] Fax: [phone]      Subjective      LMP:  Complete hysterectomy 10 years ago    Movement:   Joint pain? Yes, wrists, pelvis, and shoulders - estradiol/testosterone/progesterone compound x 4 years, in 2020 started estradiol patch- patch didn't stick, tried gel - insurance didn't cover, switches to oral in July 2024.   Strength training? Gym 3x/week - walks x 30 mins and weights.   Recommend 2-3x/week for 20 minutes  Osteopenia or osteoporosis: unsure had been told in the past her \"bones were old\"     Stress Level (rate 1-10): 2 out 10    Depression? Not currently, experience post partum     Sleep:   Quantity/Quality/Regularity? Falls to sleep easily with medication, feels progesterone is helpful, but lots of tossing and turning. Fragmented. 10-10:30pm to 5:30am (~6-7 hours)  Daytime brain fog/memory troubles? Yes and ADHD  Tobacco? No, stopped in 2002  Alcohol? No  THC? No    Patient identified goals:   I want to loose weight so that I can feel healthy  I want my clothes to fit better  Has family history of health problems, wants to avoid.     Onset:   Gained 30 lbs in 1 year with exercising and physical job. Not gaining currently but not losing.     Current challenges:   Doesn't have much of an appetite - eats 3 small meals    How long implementing diet/lifestyle change for weight loss:   Since January of 2024    Disordered eating patterns:   None    Weight Loss Drug Therapy Options Screening:     GLP-1 and Metformin:     Pre-Diabetes/Diabetes? Gestation diabetes - 2014  Lab Results   Component " "Value Date    INSULFAST 7 09/12/2024    GLUF 92 09/12/2024    HGBA1C 4.6 09/12/2024       Pertinent PMH Review:   PMH of Pancreatitis: No  PMH of Gallbladder disease: No  PMH of Delayed Gastric Emptying: No  GI issues? No - stomach upset stomach stomach from onions  Frequency of BM? 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 Yes  Patient was on topiramate \"a long while ago\" - she didn't like that should couldn't drink and had less of an appetite.     Adipex:   Anxiety? Yes  Not appropriate with Vyvanse and history of anxiety (currently on buspar).     Thyroid health:   Lab Results   Component Value Date    TSH 1.16 09/12/2024        HRT  Any Contraindications and/or Cautions to HT: Currently on MHT.     Medications potentially contributing to weight gain:   None    History of Gout? No, stopped soda \"a few months back\" - enjoys sparking water  If yes or high fructose diet (soda, hfcs, agave, fruit juice, etc.) order uric acid level.   Uric acid levels above 5.5 mg/dL are linked to increased risk of metabolic diseases.   Fructose metabolism generates uric acid, signaling to the body to increase storage of fat.   Uric acid decreases nitric oxide, constricting blood vessels and impairing insulin function.   High sodium intake can increase uric acid by signaling dehydration, but staying hydrated dilutes sodiums effect.   Elevated levels increase hunger and impulsivity.     Medications tried/stopped for weight management:    None        Concurrent Chronic Medical Conditions:   Cardiovascular Health  The 10-year ASCVD risk score (Prosper SALCIDO, et al., 2019) is: 0.3%    Values used to calculate the score:      Age: 39 years      Sex: Female      Is Non- : No      Diabetic: No      Tobacco smoker: No      Systolic Blood Pressure: 124 mmHg      Is BP " "treated: No      HDL Cholesterol: 71 MG/DL      Total Cholesterol: 199 MG/DL    Lab Results   Component Value Date    CHOL 199 05/01/2023     Lab Results   Component Value Date    HDL 71 05/01/2023     Lab Results   Component Value Date    LDLCALC 82 05/01/2023     Lab Results   Component Value Date    TRIG 230 (H) 05/01/2023     No components found for: \"CHOLHDL\"   BP Readings from Last 3 Encounters:   09/24/24 124/81   08/27/24 118/78   07/16/24 120/68      Blood Sugar Balance  Lab Results   Component Value Date    GLUCOSE 87 05/01/2023    HGBA1C 4.6 09/12/2024    HGBA1C 4.9 04/23/2021     Lab Results   Component Value Date    INSULFAST 7 09/12/2024    GLUF 92 09/12/2024         Thyroid  Lab Results   Component Value Date    TSH 1.16 09/12/2024     Iron Status  No results found for: \"IRON\", \"TIBC\", \"FERRITIN\"     Kidney Function  Lab Results   Component Value Date    CREATININE 0.6 05/01/2023       Potassium  Lab Results   Component Value Date    K 3.5 05/01/2023        Vitamin D3  Lab Results   Component Value Date    VITD25 25 (L) 05/01/2023       Current Outpatient Medications on File Prior to Visit   Medication Sig Dispense Refill    busPIRone (Buspar) 15 mg tablet Take 1 tablet (15 mg) by mouth 3 times a day.      estradiol (Estrace) 2 mg tablet Take 1 tablet (2 mg) by mouth once daily. 30 tablet 11    fluticasone (Flonase) 50 mcg/actuation nasal spray Administer 1 spray into each nostril if needed.      lisdexamfetamine (Vyvanse) 10 MG capsule Take 1 capsule (10 mg) by mouth once daily.      lisdexamfetamine (Vyvanse) 30 mg capsule Take 1 capsule (30 mg) by mouth once daily in the morning.      melatonin 3 mg tablet Take 1-3 tablets (3-9 mg) by mouth as needed at bedtime.      Nurtec ODT 75 mg tablet,disintegrating 1 tablet on the tongue and allow to dissolve Orally for 30 day(s)      progesterone (Prometrium) 100 mg capsule Take 1 capsule (100 mg) by mouth once daily. Take at bedtime 30 capsule 11    " rizatriptan (Maxalt) 10 mg tablet Take 1 tablet (10 mg) by mouth once daily.      SUMAtriptan (Imitrex) 50 mg tablet Take one tablet at onset of headache. May repeat dose one time in 2 hours if headache not relieved.      topiramate (Topamax) 25 mg tablet Take 1 tablet (25 mg) by mouth once daily. 90 tablet 3    traZODone (Desyrel) 50 mg tablet Take 2 tablets (100 mg) by mouth once daily at bedtime.       No current facility-administered medications on file prior to visit.        Lifestyle and Nourishment Recommendations  Focus on whole foods as close to nature as possible (less than 5 ingredients on the label)  The order of eating matters during each meal, in order to minimize blood sugar spikes  First, 1/3 to 1/2 of meal as colorful vegetables eaten first  Increase fiber 25-30 grams daily (work up slowly to avoid GI distress)  Goal: Daily bowel movement  Second, eat protein and fat as part of your meal  Increase high quality protein to 25-30 grams per meal along with 2-3 x/week resistance training  May consider 3g/day of Creatine (CreaPure) daily mixed in water to support mood, cognition, muscle mass, and bone mass.    Third, have complex carbohydrates/starches as part of your meal  Consider drinking 1 tbsp of apple cider vinegar in a tall glass of water or as a salad dressing with extra virgin olive oil up to 20 minutes prior to or during a meal.   Helps to reduce blood sugar spikes from complex carbohydrates by up to 30%.   Walk or move for 10 minutes after each meal  Goal: 150-210 min/week of walking (10 minutes x 3 meals per day = 30 minutes/day) and mind/body activities (meditation, mindfulness, journaling, etc.) for a minimum of 2-5 minutes daily to reduce cortisol levels.   Beverages: Focus on water, organic tea (loose leave or in paper, avoid plastic sachets), or sparkling/mineral water (ex. Spindrift, Adelina)   No more than 1 cup (8oz) of organic coffee daily prior to noon. May consider organic green  "tea.   Avoid alcohol   Avoid ALL artificial sweeteners   Prioritize 7-8 hours of restful sleep nightly.   Turn off electronics 1 hour prior to bedtime, no electronics in the bedroom.  Establish a regular sleep/wake time (+/- 30 minutes)     Medication and allergy reconciliation completed     Drug Interactions   No significant drug interactions identified    Assessment/Plan   Patients goals:   I want to loose weight so that I can feel healthy  I want my clothes to fit better  Has family history of health problems, wants to avoid.   Discussed patients history, current medical conditions, medications, as well as current diet and lifestyle in depth.   Patient has been making diet/lifestyle changes for: since January  BMI: 32 (163 lbs)  Hysterectomy (2015-16)  Concurrent medical conditions: gestational diabetes 3 out of 4 children, hyperlipidemia (had taken last lipid panel not fasting).   JOHN-IR: 1.6  A1C: 4.6%  BM's daily now with magnesium every other day.   Patient has a history of migraine   Medicaid denied PA for Zepbound, she would like to try Topiramate to address headaches as well as binge eating/weight loss.   Topiramate  Has noticed a decrease in appetite. Experiencing some intermittent indigestion. Encouraged keeping track to see if we can find a pattern.   Struggling with remembering to eat breakfast.   Decreased sweet cravings at night.   Migraines have been \"really good\" - feels a few \"want to start\" and have resolved with tylenol and/or ibuprofen.     CONTINUE  Topiramate 25mg once daily at bedtime    Follow up: 12/18/24 10:40 am     Time spent with pt: Total length of time 15 (minutes) of the encounter and more than 50% was spent counseling the patient.    Milagro Jason, Pharm.D, FAStill[facility]irm[facility], [facility]  Clinical Pharmacist  Pharmacy Services  [phone]    Continue all meds under the continuation of care with the referring provider and clinical pharmacy team.    Verbal consent to manage patient's drug therapy was " obtained from the patient and/or an individual authorized to act on behalf of a patient. They were informed they may decline to participate or withdraw from participation in pharmacy services at any time.

## 2024-11-19 ENCOUNTER — APPOINTMENT (OUTPATIENT)
Dept: PHARMACY | Facility: HOSPITAL | Age: 39
End: 2024-11-19
Payer: COMMERCIAL

## 2024-11-19 DIAGNOSIS — G43.019 REFRACTORY MIGRAINE WITHOUT AURA: ICD-10-CM

## 2024-11-19 DIAGNOSIS — R63.5 WEIGHT GAIN: ICD-10-CM

## 2024-11-21 ENCOUNTER — APPOINTMENT (OUTPATIENT)
Dept: PRIMARY CARE | Facility: CLINIC | Age: 39
End: 2024-11-21
Payer: COMMERCIAL

## 2024-12-16 NOTE — PROGRESS NOTES
"  Patient ID: Izabela Nunes is a 39 y.o. female who presents for No chief complaint on file..    Referring Provider: Reece Samuel  Pt was referred for weight loss     Preferred Pharmacy:    [id number] - [place], [place] - [address] [address]  [address] [place] [address]  [place] [address]  Phone: [phone] Fax: [phone]    [facility]  [address], [address]  [facility] [address]  Phone: [phone] Fax: [phone]      Subjective      LMP:  Complete hysterectomy 10 years ago    Movement:   Joint pain? Yes, wrists, pelvis, and shoulders - estradiol/testosterone/progesterone compound x 4 years, in 2020 started estradiol patch- patch didn't stick, tried gel - insurance didn't cover, switches to oral in July 2024.   Strength training? Gym 3x/week - walks x 30 mins and weights.   Recommend 2-3x/week for 20 minutes  Osteopenia or osteoporosis: unsure had been told in the past her \"bones were old\"     Stress Level (rate 1-10): 2 out 10    Depression? Not currently, experience post partum     Sleep:   Quantity/Quality/Regularity? Falls to sleep easily with medication, feels progesterone is helpful, but lots of tossing and turning. Fragmented. 10-10:30pm to 5:30am (~6-7 hours)  Daytime brain fog/memory troubles? Yes and ADHD  Tobacco? No, stopped in 2002  Alcohol? No  THC? No    Patient identified goals:   I want to loose weight so that I can feel healthy  I want my clothes to fit better  Has family history of health problems, wants to avoid.     Onset:   Gained 30 lbs in 1 year with exercising and physical job. Not gaining currently but not losing.     Current challenges:   Doesn't have much of an appetite - eats 3 small meals    How long implementing diet/lifestyle change for weight loss:   Since January of 2024    Disordered eating patterns:   None    Weight Loss Drug Therapy Options Screening:     GLP-1 and Metformin:     Pre-Diabetes/Diabetes? Gestation diabetes - 2014  Lab Results   Component " "Value Date    INSULFAST 7 09/12/2024    GLUF 92 09/12/2024    HGBA1C 4.6 09/12/2024       Pertinent PMH Review:   PMH of Pancreatitis: No  PMH of Gallbladder disease: No  PMH of Delayed Gastric Emptying: No  GI issues? No - stomach upset stomach stomach from onions  Frequency of BM? 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 Yes  Patient was on topiramate \"a long while ago\" - she didn't like that should couldn't drink and had less of an appetite.     Adipex:   Anxiety? Yes  Not appropriate with Vyvanse and history of anxiety (currently on buspar).     Thyroid health:   Lab Results   Component Value Date    TSH 1.16 09/12/2024        HRT  Any Contraindications and/or Cautions to HT: Currently on MHT.     Medications potentially contributing to weight gain:   None    History of Gout? No, stopped soda \"a few months back\" - enjoys sparking water  If yes or high fructose diet (soda, hfcs, agave, fruit juice, etc.) order uric acid level.   Uric acid levels above 5.5 mg/dL are linked to increased risk of metabolic diseases.   Fructose metabolism generates uric acid, signaling to the body to increase storage of fat.   Uric acid decreases nitric oxide, constricting blood vessels and impairing insulin function.   High sodium intake can increase uric acid by signaling dehydration, but staying hydrated dilutes sodiums effect.   Elevated levels increase hunger and impulsivity.     Medications tried/stopped for weight management:    None        Concurrent Chronic Medical Conditions:   Cardiovascular Health  The 10-year ASCVD risk score (Prosper SALCIDO, et al., 2019) is: 0.3%    Values used to calculate the score:      Age: 39 years      Sex: Female      Is Non- : No      Diabetic: No      Tobacco smoker: No      Systolic Blood Pressure: 124 mmHg      Is BP " "treated: No      HDL Cholesterol: 71 MG/DL      Total Cholesterol: 199 MG/DL    Lab Results   Component Value Date    CHOL 199 05/01/2023     Lab Results   Component Value Date    HDL 71 05/01/2023     Lab Results   Component Value Date    LDLCALC 82 05/01/2023     Lab Results   Component Value Date    TRIG 230 (H) 05/01/2023     No components found for: \"CHOLHDL\"   BP Readings from Last 3 Encounters:   09/24/24 124/81   08/27/24 118/78   07/16/24 120/68      Blood Sugar Balance  Lab Results   Component Value Date    GLUCOSE 87 05/01/2023    HGBA1C 4.6 09/12/2024    HGBA1C 4.9 04/23/2021     Lab Results   Component Value Date    INSULFAST 7 09/12/2024    GLUF 92 09/12/2024         Thyroid  Lab Results   Component Value Date    TSH 1.16 09/12/2024     Iron Status  No results found for: \"IRON\", \"TIBC\", \"FERRITIN\"     Kidney Function  Lab Results   Component Value Date    CREATININE 0.6 05/01/2023       Potassium  Lab Results   Component Value Date    K 3.5 05/01/2023        Vitamin D3  Lab Results   Component Value Date    VITD25 25 (L) 05/01/2023       Current Outpatient Medications on File Prior to Visit   Medication Sig Dispense Refill    busPIRone (Buspar) 15 mg tablet Take 1 tablet (15 mg) by mouth 3 times a day.      estradiol (Estrace) 2 mg tablet Take 1 tablet (2 mg) by mouth once daily. 30 tablet 11    fluticasone (Flonase) 50 mcg/actuation nasal spray Administer 1 spray into each nostril if needed.      lisdexamfetamine (Vyvanse) 10 MG capsule Take 1 capsule (10 mg) by mouth once daily.      lisdexamfetamine (Vyvanse) 30 mg capsule Take 1 capsule (30 mg) by mouth once daily in the morning.      melatonin 3 mg tablet Take 1-3 tablets (3-9 mg) by mouth as needed at bedtime.      Nurtec ODT 75 mg tablet,disintegrating 1 tablet on the tongue and allow to dissolve Orally for 30 day(s)      progesterone (Prometrium) 100 mg capsule Take 1 capsule (100 mg) by mouth once daily. Take at bedtime 30 capsule 11    " rizatriptan (Maxalt) 10 mg tablet Take 1 tablet (10 mg) by mouth once daily.      SUMAtriptan (Imitrex) 50 mg tablet Take one tablet at onset of headache. May repeat dose one time in 2 hours if headache not relieved.      topiramate (Topamax) 25 mg tablet Take 1 tablet (25 mg) by mouth once daily. 90 tablet 3    traZODone (Desyrel) 50 mg tablet Take 2 tablets (100 mg) by mouth once daily at bedtime.       No current facility-administered medications on file prior to visit.        Lifestyle and Nourishment Recommendations  Focus on whole foods as close to nature as possible (less than 5 ingredients on the label)  The order of eating matters during each meal, in order to minimize blood sugar spikes  First, 1/3 to 1/2 of meal as colorful vegetables eaten first  Increase fiber 25-30 grams daily (work up slowly to avoid GI distress)  Goal: Daily bowel movement  Second, eat protein and fat as part of your meal  Increase high quality protein to 25-30 grams per meal along with 2-3 x/week resistance training  May consider 3g/day of Creatine (CreaPure) daily mixed in water to support mood, cognition, muscle mass, and bone mass.    Third, have complex carbohydrates/starches as part of your meal  Consider drinking 1 tbsp of apple cider vinegar in a tall glass of water or as a salad dressing with extra virgin olive oil up to 20 minutes prior to or during a meal.   Helps to reduce blood sugar spikes from complex carbohydrates by up to 30%.   Walk or move for 10 minutes after each meal  Goal: 150-210 min/week of walking (10 minutes x 3 meals per day = 30 minutes/day) and mind/body activities (meditation, mindfulness, journaling, etc.) for a minimum of 2-5 minutes daily to reduce cortisol levels.   Beverages: Focus on water, organic tea (loose leave or in paper, avoid plastic sachets), or sparkling/mineral water (ex. Spindrift, Adelina)   No more than 1 cup (8oz) of organic coffee daily prior to noon. May consider organic green  "tea.   Avoid alcohol   Avoid ALL artificial sweeteners   Prioritize 7-8 hours of restful sleep nightly.   Turn off electronics 1 hour prior to bedtime, no electronics in the bedroom.  Establish a regular sleep/wake time (+/- 30 minutes)     Medication and allergy reconciliation completed     Drug Interactions   No significant drug interactions identified    Assessment/Plan   Patients goals:   I want to loose weight so that I can feel healthy  I want my clothes to fit better  Has family history of health problems, wants to avoid.   Discussed patients history, current medical conditions, medications, as well as current diet and lifestyle in depth.   Patient has been making diet/lifestyle changes for: since January  BMI: 32 (163 lbs)  Hysterectomy (2015-16)  Concurrent medical conditions: gestational diabetes 3 out of 4 children, hyperlipidemia (had taken last lipid panel not fasting).   JOHN-IR: 1.6  A1C: 4.6%  BM's daily now with magnesium every other day.   Patient has a history of migraine   Medicaid denied PA for Zepbound, she would like to try Topiramate to address headaches as well as binge eating/weight loss.   Topiramate  Notices appetite not as decreased as it could be, but does still notice some appetite reduction.   Notices clothes are fitting more loosely.    had been helping her with having breakfast, toast with ricotta, tomato, and everything bagel seasoning.   Having more cravings.   Migraines have been \"really good\"   Plan to increase to topiramate 50mg daily at bedtime.     INCREASE  Topiramate 50mg once daily at bedtime    Follow up: 1/21/25 2:40pm     Time spent with pt: Total length of time 10 (minutes) of the encounter and more than 50% was spent counseling the patient.    Milagro Jason, Pharm.D, [facility], [facility]  Clinical Pharmacist  Pharmacy Services  [phone]    Continue all meds under the continuation of care with the referring provider and clinical pharmacy team.    Verbal consent to " manage patient's drug therapy was obtained from the patient and/or an individual authorized to act on behalf of a patient. They were informed they may decline to participate or withdraw from participation in pharmacy services at any time.

## 2024-12-18 ENCOUNTER — APPOINTMENT (OUTPATIENT)
Dept: PHARMACY | Facility: HOSPITAL | Age: 39
End: 2024-12-18
Payer: COMMERCIAL

## 2024-12-18 DIAGNOSIS — G43.019 REFRACTORY MIGRAINE WITHOUT AURA: ICD-10-CM

## 2024-12-18 DIAGNOSIS — R63.5 WEIGHT GAIN: ICD-10-CM

## 2024-12-18 PROCEDURE — RXMED WILLOW AMBULATORY MEDICATION CHARGE

## 2024-12-18 RX ORDER — TOPIRAMATE 50 MG/1
50 TABLET, FILM COATED ORAL 2 TIMES DAILY
Qty: 90 TABLET | Refills: 3 | Status: SHIPPED | OUTPATIENT
Start: 2024-12-18

## 2024-12-20 ENCOUNTER — PHARMACY VISIT (OUTPATIENT)
Dept: PHARMACY | Facility: CLINIC | Age: 39
End: 2024-12-20
Payer: MEDICAID

## 2024-12-24 ENCOUNTER — HOSPITAL ENCOUNTER (OUTPATIENT)
Dept: RADIOLOGY | Facility: CLINIC | Age: 39
Discharge: HOME | End: 2024-12-24
Payer: COMMERCIAL

## 2024-12-24 DIAGNOSIS — R92.30 DENSE BREAST TISSUE: ICD-10-CM

## 2024-12-24 DIAGNOSIS — Z12.39 BREAST CANCER SCREENING, HIGH RISK PATIENT: ICD-10-CM

## 2024-12-24 PROCEDURE — 77049 MRI BREAST C-+ W/CAD BI: CPT

## 2024-12-24 PROCEDURE — A9575 INJ GADOTERATE MEGLUMI 0.1ML: HCPCS | Performed by: NURSE PRACTITIONER

## 2024-12-24 PROCEDURE — 77049 MRI BREAST C-+ W/CAD BI: CPT | Performed by: RADIOLOGY

## 2024-12-24 PROCEDURE — 2550000001 HC RX 255 CONTRASTS: Performed by: NURSE PRACTITIONER

## 2024-12-24 RX ORDER — GADOTERATE MEGLUMINE 376.9 MG/ML
15 INJECTION INTRAVENOUS
Status: COMPLETED | OUTPATIENT
Start: 2024-12-24 | End: 2024-12-24

## 2025-01-20 NOTE — PROGRESS NOTES
"  Patient ID: Izabela Nunes is a 39 y.o. female who presents for No chief complaint on file..    Referring Provider: Reece Samuel  Pt was referred for weight loss     Preferred Pharmacy:    [id number] - [place], [place] - [address] [place] [address]  [address] [place] [address]  [place] [address]  Phone: [phone] Fax: [phone]    [facility]  [address], [address]  [facility] [address]  Phone: [phone] Fax: [phone]      Subjective      LMP:  Complete hysterectomy 10 years ago    Movement:   Joint pain? Yes, wrists, pelvis, and shoulders - estradiol/testosterone/progesterone compound x 4 years, in 2020 started estradiol patch- patch didn't stick, tried gel - insurance didn't cover, switches to oral in July 2024.   Strength training? Gym 3x/week - walks x 30 mins and weights.   Recommend 2-3x/week for 20 minutes  Osteopenia or osteoporosis: unsure had been told in the past her \"bones were old\"     Stress Level (rate 1-10): 2 out 10    Depression? Not currently, experience post partum     Sleep:   Quantity/Quality/Regularity? Falls to sleep easily with medication, feels progesterone is helpful, but lots of tossing and turning. Fragmented. 10-10:30pm to 5:30am (~6-7 hours)  Daytime brain fog/memory troubles? Yes and ADHD  Tobacco? No, stopped in 2002  Alcohol? No  THC? No    Patient identified goals:   I want to loose weight so that I can feel healthy  I want my clothes to fit better  Has family history of health problems, wants to avoid.     Onset:   Gained 30 lbs in 1 year with exercising and physical job. Not gaining currently but not losing.     Current challenges:   Doesn't have much of an appetite - eats 3 small meals    How long implementing diet/lifestyle change for weight loss:   Since January of 2024    Disordered eating patterns:   None    Weight Loss Drug Therapy Options Screening:     GLP-1 and Metformin:     Pre-Diabetes/Diabetes? Gestation diabetes - 2014  Lab Results   Component " "Value Date    INSULFAST 7 09/12/2024    GLUF 92 09/12/2024    HGBA1C 4.6 09/12/2024       Pertinent PMH Review:   PMH of Pancreatitis: No  PMH of Gallbladder disease: No  PMH of Delayed Gastric Emptying: No  GI issues? No - stomach upset stomach stomach from onions  Frequency of BM? 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 Yes  Patient was on topiramate \"a long while ago\" - she didn't like that should couldn't drink and had less of an appetite.     Adipex:   Anxiety? Yes  Not appropriate with Vyvanse and history of anxiety (currently on buspar).     Thyroid health:   Lab Results   Component Value Date    TSH 1.16 09/12/2024        HRT  Any Contraindications and/or Cautions to HT: Currently on MHT.     Medications potentially contributing to weight gain:   None    History of Gout? No, stopped soda \"a few months back\" - enjoys sparking water  If yes or high fructose diet (soda, hfcs, agave, fruit juice, etc.) order uric acid level.   Uric acid levels above 5.5 mg/dL are linked to increased risk of metabolic diseases.   Fructose metabolism generates uric acid, signaling to the body to increase storage of fat.   Uric acid decreases nitric oxide, constricting blood vessels and impairing insulin function.   High sodium intake can increase uric acid by signaling dehydration, but staying hydrated dilutes sodiums effect.   Elevated levels increase hunger and impulsivity.     Medications tried/stopped for weight management:    None        Concurrent Chronic Medical Conditions:   Cardiovascular Health  The 10-year ASCVD risk score (Prosper SALCIDO, et al., 2019) is: 0.3%    Values used to calculate the score:      Age: 39 years      Sex: Female      Is Non- : No      Diabetic: No      Tobacco smoker: No      Systolic Blood Pressure: 124 mmHg      Is BP " "treated: No      HDL Cholesterol: 71 MG/DL      Total Cholesterol: 199 MG/DL    Lab Results   Component Value Date    CHOL 199 05/01/2023     Lab Results   Component Value Date    HDL 71 05/01/2023     Lab Results   Component Value Date    LDLCALC 82 05/01/2023     Lab Results   Component Value Date    TRIG 230 (H) 05/01/2023     No components found for: \"CHOLHDL\"   BP Readings from Last 3 Encounters:   09/24/24 124/81   08/27/24 118/78   07/16/24 120/68      Blood Sugar Balance  Lab Results   Component Value Date    GLUCOSE 87 05/01/2023    HGBA1C 4.6 09/12/2024    HGBA1C 4.9 04/23/2021     Lab Results   Component Value Date    INSULFAST 7 09/12/2024    GLUF 92 09/12/2024         Thyroid  Lab Results   Component Value Date    TSH 1.16 09/12/2024     Iron Status  No results found for: \"IRON\", \"TIBC\", \"FERRITIN\"     Kidney Function  Lab Results   Component Value Date    CREATININE 0.6 05/01/2023       Potassium  Lab Results   Component Value Date    K 3.5 05/01/2023        Vitamin D3  Lab Results   Component Value Date    VITD25 25 (L) 05/01/2023       Current Outpatient Medications on File Prior to Visit   Medication Sig Dispense Refill    busPIRone (Buspar) 15 mg tablet Take 1 tablet (15 mg) by mouth 3 times a day.      estradiol (Estrace) 2 mg tablet Take 1 tablet (2 mg) by mouth once daily. 30 tablet 11    fluticasone (Flonase) 50 mcg/actuation nasal spray Administer 1 spray into each nostril if needed.      lisdexamfetamine (Vyvanse) 10 MG capsule Take 1 capsule (10 mg) by mouth once daily.      lisdexamfetamine (Vyvanse) 30 mg capsule Take 1 capsule (30 mg) by mouth once daily in the morning.      melatonin 3 mg tablet Take 1-3 tablets (3-9 mg) by mouth as needed at bedtime.      Nurtec ODT 75 mg tablet,disintegrating 1 tablet on the tongue and allow to dissolve Orally for 30 day(s)      progesterone (Prometrium) 100 mg capsule Take 1 capsule (100 mg) by mouth once daily. Take at bedtime 30 capsule 11    " rizatriptan (Maxalt) 10 mg tablet Take 1 tablet (10 mg) by mouth once daily.      SUMAtriptan (Imitrex) 50 mg tablet Take one tablet at onset of headache. May repeat dose one time in 2 hours if headache not relieved.      topiramate (Topamax) 50 mg tablet Take 1 tablet (50 mg) by mouth 2 times a day. 90 tablet 3    traZODone (Desyrel) 50 mg tablet Take 2 tablets (100 mg) by mouth once daily at bedtime.       No current facility-administered medications on file prior to visit.        Lifestyle and Nourishment Recommendations  Focus on whole foods as close to nature as possible (less than 5 ingredients on the label)  The order of eating matters during each meal, in order to minimize blood sugar spikes  First, 1/3 to 1/2 of meal as colorful vegetables eaten first  Increase fiber 25-30 grams daily (work up slowly to avoid GI distress)  Goal: Daily bowel movement  Second, eat protein and fat as part of your meal  Increase high quality protein to 25-30 grams per meal along with 2-3 x/week resistance training  May consider 3g/day of Creatine (CreaPure) daily mixed in water to support mood, cognition, muscle mass, and bone mass.    Third, have complex carbohydrates/starches as part of your meal  Consider drinking 1 tbsp of apple cider vinegar in a tall glass of water or as a salad dressing with extra virgin olive oil up to 20 minutes prior to or during a meal.   Helps to reduce blood sugar spikes from complex carbohydrates by up to 30%.   Walk or move for 10 minutes after each meal  Goal: 150-210 min/week of walking (10 minutes x 3 meals per day = 30 minutes/day) and mind/body activities (meditation, mindfulness, journaling, etc.) for a minimum of 2-5 minutes daily to reduce cortisol levels.   Beverages: Focus on water, organic tea (loose leave or in paper, avoid plastic sachets), or sparkling/mineral water (ex. Spindrift, Adelina)   No more than 1 cup (8oz) of organic coffee daily prior to noon. May consider organic green  tea.   Avoid alcohol   Avoid ALL artificial sweeteners   Prioritize 7-8 hours of restful sleep nightly.   Turn off electronics 1 hour prior to bedtime, no electronics in the bedroom.  Establish a regular sleep/wake time (+/- 30 minutes)     Medication and allergy reconciliation completed     Drug Interactions   No significant drug interactions identified    Assessment/Plan   Patients goals:   I want to loose weight so that I can feel healthy  I want my clothes to fit better  Has family history of health problems, wants to avoid.   Discussed patients history, current medical conditions, medications, as well as current diet and lifestyle in depth.   Patient has been making diet/lifestyle changes for: since January  BMI: 32 (163 lbs)  Hysterectomy (2015-16)  Concurrent medical conditions: gestational diabetes 3 out of 4 children, hyperlipidemia (had taken last lipid panel not fasting).   JOHN-IR: 1.6  A1C: 4.6%  BM's daily now with magnesium every other day.   Patient has a history of migraine   Medicaid denied PA for Zepbound, she would like to try Topiramate to address headaches as well as binge eating/weight loss.   Topiramate  Tolerating dose increase well, no side effects  Notices clothes are fitting more loosely.   Cravings have improved  Migraines have been ok, has been having some trouble with the cold weather      CONTINUE  Topiramate 50mg once daily at bedtime    Follow up: 4/22/25 2:40pm     Time spent with pt: Total length of time 10 (minutes) of the encounter and more than 50% was spent counseling the patient.    Milagro Jason, Pharm.D, [facility], [facility]  Clinical Pharmacist  Pharmacy Services  [phone]    Continue all meds under the continuation of care with the referring provider and clinical pharmacy team.    Verbal consent to manage patient's drug therapy was obtained from the patient and/or an individual authorized to act on behalf of a patient. They were informed they may decline to participate or  withdraw from participation in pharmacy services at any time.

## 2025-01-21 ENCOUNTER — APPOINTMENT (OUTPATIENT)
Dept: PHARMACY | Facility: HOSPITAL | Age: 40
End: 2025-01-21
Payer: COMMERCIAL

## 2025-01-21 DIAGNOSIS — R63.5 WEIGHT GAIN: ICD-10-CM

## 2025-01-21 DIAGNOSIS — G43.019 REFRACTORY MIGRAINE WITHOUT AURA: ICD-10-CM

## 2025-01-27 PROCEDURE — RXMED WILLOW AMBULATORY MEDICATION CHARGE

## 2025-01-29 ENCOUNTER — PHARMACY VISIT (OUTPATIENT)
Dept: PHARMACY | Facility: CLINIC | Age: 40
End: 2025-01-29
Payer: MEDICAID

## 2025-02-19 ENCOUNTER — OFFICE VISIT (OUTPATIENT)
Dept: PRIMARY CARE | Facility: CLINIC | Age: 40
End: 2025-02-19
Payer: COMMERCIAL

## 2025-02-19 VITALS
OXYGEN SATURATION: 98 % | HEART RATE: 96 BPM | BODY MASS INDEX: 29.25 KG/M2 | WEIGHT: 149 LBS | HEIGHT: 60 IN | TEMPERATURE: 97.5 F | SYSTOLIC BLOOD PRESSURE: 110 MMHG | DIASTOLIC BLOOD PRESSURE: 70 MMHG

## 2025-02-19 DIAGNOSIS — J01.00 ACUTE NON-RECURRENT MAXILLARY SINUSITIS: Primary | ICD-10-CM

## 2025-02-19 DIAGNOSIS — R05.1 ACUTE COUGH: ICD-10-CM

## 2025-02-19 LAB
POC RAPID INFLUENZA A: NEGATIVE
POC RAPID INFLUENZA B: NEGATIVE

## 2025-02-19 PROCEDURE — 87804 INFLUENZA ASSAY W/OPTIC: CPT | Performed by: REGISTERED NURSE

## 2025-02-19 PROCEDURE — 99214 OFFICE O/P EST MOD 30 MIN: CPT | Performed by: REGISTERED NURSE

## 2025-02-19 PROCEDURE — 3008F BODY MASS INDEX DOCD: CPT | Performed by: REGISTERED NURSE

## 2025-02-19 PROCEDURE — 1036F TOBACCO NON-USER: CPT | Performed by: REGISTERED NURSE

## 2025-02-19 RX ORDER — BENZONATATE 200 MG/1
200 CAPSULE ORAL 3 TIMES DAILY PRN
Qty: 42 CAPSULE | Refills: 0 | Status: SHIPPED | OUTPATIENT
Start: 2025-02-19 | End: 2025-03-21

## 2025-02-19 RX ORDER — ALBUTEROL SULFATE AND BUDESONIDE 90; 80 UG/1; UG/1
2 AEROSOL, METERED RESPIRATORY (INHALATION) EVERY 4 HOURS PRN
Qty: 32.1 G | Refills: 0 | Status: SHIPPED | OUTPATIENT
Start: 2025-02-19 | End: 2025-05-20

## 2025-02-19 RX ORDER — AZITHROMYCIN 250 MG/1
TABLET, FILM COATED ORAL
Qty: 6 TABLET | Refills: 0 | Status: SHIPPED | OUTPATIENT
Start: 2025-02-19 | End: 2025-02-24

## 2025-02-19 RX ORDER — PREDNISONE 20 MG/1
20 TABLET ORAL DAILY
Qty: 5 TABLET | Refills: 0 | Status: SHIPPED | OUTPATIENT
Start: 2025-02-19 | End: 2025-02-24

## 2025-02-19 ASSESSMENT — ENCOUNTER SYMPTOMS
COUGH: 1
SINUS PAIN: 1
DEPRESSION: 0
OCCASIONAL FEELINGS OF UNSTEADINESS: 0
EYE DISCHARGE: 1
SINUS PRESSURE: 1
LOSS OF SENSATION IN FEET: 0
EYE REDNESS: 1

## 2025-02-19 ASSESSMENT — COLUMBIA-SUICIDE SEVERITY RATING SCALE - C-SSRS
6. HAVE YOU EVER DONE ANYTHING, STARTED TO DO ANYTHING, OR PREPARED TO DO ANYTHING TO END YOUR LIFE?: NO
2. HAVE YOU ACTUALLY HAD ANY THOUGHTS OF KILLING YOURSELF?: NO
1. IN THE PAST MONTH, HAVE YOU WISHED YOU WERE DEAD OR WISHED YOU COULD GO TO SLEEP AND NOT WAKE UP?: NO

## 2025-02-19 ASSESSMENT — PATIENT HEALTH QUESTIONNAIRE - PHQ9
2. FEELING DOWN, DEPRESSED OR HOPELESS: NOT AT ALL
SUM OF ALL RESPONSES TO PHQ9 QUESTIONS 1 AND 2: 0
1. LITTLE INTEREST OR PLEASURE IN DOING THINGS: NOT AT ALL

## 2025-02-19 ASSESSMENT — PAIN SCALES - GENERAL: PAINLEVEL_OUTOF10: 7

## 2025-02-20 ENCOUNTER — APPOINTMENT (OUTPATIENT)
Dept: PRIMARY CARE | Facility: CLINIC | Age: 40
End: 2025-02-20
Payer: COMMERCIAL

## 2025-02-20 LAB
RSV AG NOSE QL: NORMAL
SARS-COV-2 RNA RESP QL NAA+PROBE: NOT DETECTED

## 2025-03-24 ENCOUNTER — OFFICE VISIT (OUTPATIENT)
Dept: PRIMARY CARE | Facility: CLINIC | Age: 40
End: 2025-03-24
Payer: COMMERCIAL

## 2025-03-24 VITALS
WEIGHT: 150 LBS | BODY MASS INDEX: 29.45 KG/M2 | HEART RATE: 82 BPM | HEIGHT: 60 IN | DIASTOLIC BLOOD PRESSURE: 72 MMHG | RESPIRATION RATE: 18 BRPM | TEMPERATURE: 98.1 F | SYSTOLIC BLOOD PRESSURE: 122 MMHG | OXYGEN SATURATION: 98 %

## 2025-03-24 DIAGNOSIS — Z00.00 ROUTINE GENERAL MEDICAL EXAMINATION AT A HEALTH CARE FACILITY: Primary | ICD-10-CM

## 2025-03-24 DIAGNOSIS — J30.89 SEASONAL ALLERGIC RHINITIS DUE TO OTHER ALLERGIC TRIGGER: ICD-10-CM

## 2025-03-24 PROCEDURE — 1036F TOBACCO NON-USER: CPT | Performed by: NURSE PRACTITIONER

## 2025-03-24 PROCEDURE — 99396 PREV VISIT EST AGE 40-64: CPT | Performed by: NURSE PRACTITIONER

## 2025-03-24 PROCEDURE — 3008F BODY MASS INDEX DOCD: CPT | Performed by: NURSE PRACTITIONER

## 2025-03-24 RX ORDER — FLUTICASONE PROPIONATE 50 MCG
1 SPRAY, SUSPENSION (ML) NASAL DAILY
Qty: 16 G | Refills: 5 | Status: SHIPPED | OUTPATIENT
Start: 2025-03-24 | End: 2026-03-24

## 2025-03-24 ASSESSMENT — ENCOUNTER SYMPTOMS
SORE THROAT: 1
LOSS OF SENSATION IN FEET: 0
DEPRESSION: 0
OCCASIONAL FEELINGS OF UNSTEADINESS: 0

## 2025-03-24 ASSESSMENT — PAIN SCALES - GENERAL: PAINLEVEL_OUTOF10: 0-NO PAIN

## 2025-03-24 NOTE — PROGRESS NOTES
Subjective   Patient ID: Izabela Nunes is a 39 y.o. female who presents for Annual Exam.    HPI soreness throat , noticed more post nasal drainage     Review of Systems   HENT:  Positive for postnasal drip and sore throat.        Objective   /72   Pulse 82   Temp 36.7 °C (98.1 °F)   Resp 18   Ht 1.524 m (5')   Wt 68 kg (150 lb)   SpO2 98%   BMI 29.29 kg/m²     Physical Exam  Constitutional:       General: She is not in acute distress.     Appearance: Normal appearance.   Cardiovascular:      Rate and Rhythm: Normal rate and regular rhythm.      Heart sounds: No murmur heard.  Pulmonary:      Breath sounds: Normal breath sounds. No wheezing.   Neurological:      Mental Status: She is alert.         Assessment/Plan   Problem List Items Addressed This Visit    None  Visit Diagnoses         Codes    Routine general medical examination at a health care facility    -  Primary Z00.00             Future labs ordered

## 2025-03-25 PROCEDURE — RXMED WILLOW AMBULATORY MEDICATION CHARGE

## 2025-03-27 ENCOUNTER — PHARMACY VISIT (OUTPATIENT)
Dept: PHARMACY | Facility: CLINIC | Age: 40
End: 2025-03-27
Payer: MEDICAID

## 2025-04-16 LAB
ALBUMIN SERPL-MCNC: 4.3 G/DL (ref 3.6–5.1)
ALP SERPL-CCNC: 60 U/L (ref 31–125)
ALT SERPL-CCNC: 12 U/L (ref 6–29)
ANION GAP SERPL CALCULATED.4IONS-SCNC: 8 MMOL/L (CALC) (ref 7–17)
AST SERPL-CCNC: 11 U/L (ref 10–30)
BASOPHILS # BLD AUTO: 32 CELLS/UL (ref 0–200)
BASOPHILS NFR BLD AUTO: 0.5 %
BILIRUB SERPL-MCNC: 0.4 MG/DL (ref 0.2–1.2)
BUN SERPL-MCNC: 15 MG/DL (ref 7–25)
CALCIUM SERPL-MCNC: 9.4 MG/DL (ref 8.6–10.2)
CHLORIDE SERPL-SCNC: 109 MMOL/L (ref 98–110)
CHOLEST SERPL-MCNC: 231 MG/DL
CHOLEST/HDLC SERPL: 3.3 (CALC)
CO2 SERPL-SCNC: 23 MMOL/L (ref 20–32)
CREAT SERPL-MCNC: 0.74 MG/DL (ref 0.5–0.99)
EGFRCR SERPLBLD CKD-EPI 2021: 105 ML/MIN/1.73M2
EOSINOPHIL # BLD AUTO: 151 CELLS/UL (ref 15–500)
EOSINOPHIL NFR BLD AUTO: 2.4 %
ERYTHROCYTE [DISTWIDTH] IN BLOOD BY AUTOMATED COUNT: 12.8 % (ref 11–15)
GLUCOSE SERPL-MCNC: 78 MG/DL (ref 65–99)
HCT VFR BLD AUTO: 44.1 % (ref 35–45)
HDLC SERPL-MCNC: 71 MG/DL
HGB BLD-MCNC: 14.6 G/DL (ref 11.7–15.5)
LDLC SERPL CALC-MCNC: 133 MG/DL (CALC)
LYMPHOCYTES # BLD AUTO: 2426 CELLS/UL (ref 850–3900)
LYMPHOCYTES NFR BLD AUTO: 38.5 %
MCH RBC QN AUTO: 29 PG (ref 27–33)
MCHC RBC AUTO-ENTMCNC: 33.1 G/DL (ref 32–36)
MCV RBC AUTO: 87.5 FL (ref 80–100)
MONOCYTES # BLD AUTO: 365 CELLS/UL (ref 200–950)
MONOCYTES NFR BLD AUTO: 5.8 %
NEUTROPHILS # BLD AUTO: 3326 CELLS/UL (ref 1500–7800)
NEUTROPHILS NFR BLD AUTO: 52.8 %
NONHDLC SERPL-MCNC: 160 MG/DL (CALC)
PLATELET # BLD AUTO: 344 THOUSAND/UL (ref 140–400)
PMV BLD REES-ECKER: 9.5 FL (ref 7.5–12.5)
POTASSIUM SERPL-SCNC: 4 MMOL/L (ref 3.5–5.3)
PROT SERPL-MCNC: 6.4 G/DL (ref 6.1–8.1)
RBC # BLD AUTO: 5.04 MILLION/UL (ref 3.8–5.1)
SODIUM SERPL-SCNC: 140 MMOL/L (ref 135–146)
TRIGL SERPL-MCNC: 141 MG/DL
TSH SERPL-ACNC: 0.89 MIU/L
WBC # BLD AUTO: 6.3 THOUSAND/UL (ref 3.8–10.8)

## 2025-04-22 ENCOUNTER — APPOINTMENT (OUTPATIENT)
Dept: PHARMACY | Facility: HOSPITAL | Age: 40
End: 2025-04-22
Payer: COMMERCIAL

## 2025-04-22 DIAGNOSIS — G43.019 REFRACTORY MIGRAINE WITHOUT AURA: ICD-10-CM

## 2025-04-22 DIAGNOSIS — R63.5 WEIGHT GAIN: ICD-10-CM

## 2025-04-22 NOTE — PROGRESS NOTES
"  Patient ID: Izabela Nunes is a 39 y.o. female who presents for No chief complaint on file..    Referring Provider: Reece Samuel  Pt was referred for weight loss     Preferred Pharmacy:    [id number] - [place], [place] - [address]  [address]  [place] [address]  Phone: [phone] Fax: [phone]    [facility]  [address], [address]  [facility] [address]  Phone: [phone] Fax: [phone]      Subjective      LMP:  Complete hysterectomy 10 years ago    Movement:   Joint pain? Yes, wrists, pelvis, and shoulders - estradiol/testosterone/progesterone compound x 4 years, in 2020 started estradiol patch- patch didn't stick, tried gel - insurance didn't cover, switches to oral in July 2024.   Strength training? Gym 3x/week - walks x 30 mins and weights.   Recommend 2-3x/week for 20 minutes  Osteopenia or osteoporosis: unsure had been told in the past her \"bones were old\"     Stress Level (rate 1-10): 2 out 10    Depression? Not currently, experience post partum     Sleep:   Quantity/Quality/Regularity? Falls to sleep easily with medication, feels progesterone is helpful, but lots of tossing and turning. Fragmented. 10-10:30pm to 5:30am (~6-7 hours)  Daytime brain fog/memory troubles? Yes and ADHD  Tobacco? No, stopped in 2002  Alcohol? No  THC? No    Patient identified goals:   I want to loose weight so that I can feel healthy  I want my clothes to fit better  Has family history of health problems, wants to avoid.     Onset:   Gained 30 lbs in 1 year with exercising and physical job. Not gaining currently but not losing.     Current challenges:   Doesn't have much of an appetite - eats 3 small meals    How long implementing diet/lifestyle change for weight loss:   Since January of 2024    Disordered eating patterns:   None    Weight Loss Drug Therapy Options Screening:     GLP-1 and Metformin:     Pre-Diabetes/Diabetes? Gestation diabetes - 2014  Lab Results   Component " "Value Date    INSULFAST 7 09/12/2024    GLUF 92 09/12/2024    HGBA1C 4.6 09/12/2024       Pertinent PMH Review:   PMH of Pancreatitis: No  PMH of Gallbladder disease: No  PMH of Delayed Gastric Emptying: No  GI issues? No - stomach upset stomach stomach from onions  Frequency of BM? 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 Yes  Patient was on topiramate \"a long while ago\" - she didn't like that should couldn't drink and had less of an appetite.     Adipex:   Anxiety? Yes  Not appropriate with Vyvanse and history of anxiety (currently on buspar).     Thyroid health:   Lab Results   Component Value Date    TSH 0.89 04/16/2025        HRT  Any Contraindications and/or Cautions to HT: Currently on MHT.     Medications potentially contributing to weight gain:   None    History of Gout? No, stopped soda \"a few months back\" - enjoys sparking water  If yes or high fructose diet (soda, hfcs, agave, fruit juice, etc.) order uric acid level.   Uric acid levels above 5.5 mg/dL are linked to increased risk of metabolic diseases.   Fructose metabolism generates uric acid, signaling to the body to increase storage of fat.   Uric acid decreases nitric oxide, constricting blood vessels and impairing insulin function.   High sodium intake can increase uric acid by signaling dehydration, but staying hydrated dilutes sodiums effect.   Elevated levels increase hunger and impulsivity.     Medications tried/stopped for weight management:    None        Concurrent Chronic Medical Conditions:   Cardiovascular Health  The 10-year ASCVD risk score (Prosper SLACIDO, et al., 2019) is: 0.4%    Values used to calculate the score:      Age: 39 years      Sex: Female      Is Non- : No      Diabetic: No      Tobacco smoker: No      Systolic Blood Pressure: 122 mmHg      Is BP " "treated: No      HDL Cholesterol: 71 mg/dL      Total Cholesterol: 231 mg/dL    Lab Results   Component Value Date    CHOL 231 (H) 04/16/2025     Lab Results   Component Value Date    HDL 71 04/16/2025     Lab Results   Component Value Date    LDLCALC 133 (H) 04/16/2025     Lab Results   Component Value Date    TRIG 141 04/16/2025     No components found for: \"CHOLHDL\"   BP Readings from Last 3 Encounters:   03/24/25 122/72   02/19/25 110/70   09/24/24 124/81      Blood Sugar Balance  Lab Results   Component Value Date    GLUCOSE 78 04/16/2025    HGBA1C 4.6 09/12/2024    HGBA1C 4.9 04/23/2021     Lab Results   Component Value Date    INSULFAST 7 09/12/2024    GLUF 92 09/12/2024         Thyroid  Lab Results   Component Value Date    TSH 0.89 04/16/2025     Iron Status  No results found for: \"IRON\", \"TIBC\", \"FERRITIN\"     Kidney Function  Lab Results   Component Value Date    CREATININE 0.74 04/16/2025       Potassium  Lab Results   Component Value Date    K 4.0 04/16/2025        Vitamin D3  Lab Results   Component Value Date    VITD25 25 (L) 05/01/2023       Current Outpatient Medications on File Prior to Visit   Medication Sig Dispense Refill    albuterol-budesonide (Airsupra) 90-80 mcg/actuation inhaler Inhale 2 puffs every 4 hours if needed (SOB, wheezing, and bronchospasms). 32.1 g 0    busPIRone (Buspar) 15 mg tablet Take 1 tablet (15 mg) by mouth 3 times a day.      estradiol (Estrace) 2 mg tablet Take 1 tablet (2 mg) by mouth once daily. 30 tablet 11    fluticasone (Flonase) 50 mcg/actuation nasal spray Administer 1 spray into each nostril once daily. Shake gently. Before first use, prime pump. After use, clean tip and replace cap. 16 g 5    lisdexamfetamine (Vyvanse) 10 MG capsule Take 1 capsule (10 mg) by mouth once daily.      lisdexamfetamine (Vyvanse) 30 mg capsule Take 1 capsule (30 mg) by mouth once daily in the morning.      progesterone (Prometrium) 100 mg capsule Take 1 capsule (100 mg) by mouth once " daily. Take at bedtime 30 capsule 11    topiramate (Topamax) 50 mg tablet Take 1 tablet (50 mg) by mouth 2 times a day. 90 tablet 3    traZODone (Desyrel) 50 mg tablet Take 2 tablets (100 mg) by mouth once daily at bedtime.       No current facility-administered medications on file prior to visit.        Lifestyle and Nourishment Recommendations  Focus on whole foods as close to nature as possible (less than 5 ingredients on the label)  The order of eating matters during each meal, in order to minimize blood sugar spikes  First, 1/3 to 1/2 of meal as colorful vegetables eaten first  Increase fiber 25-30 grams daily (work up slowly to avoid GI distress)  Goal: Daily bowel movement  Second, eat protein and fat as part of your meal  Increase high quality protein to 25-30 grams per meal along with 2-3 x/week resistance training  May consider 3g/day of Creatine (CreaPure) daily mixed in water to support mood, cognition, muscle mass, and bone mass.    Third, have complex carbohydrates/starches as part of your meal  Consider drinking 1 tbsp of apple cider vinegar in a tall glass of water or as a salad dressing with extra virgin olive oil up to 20 minutes prior to or during a meal.   Helps to reduce blood sugar spikes from complex carbohydrates by up to 30%.   Walk or move for 10 minutes after each meal  Goal: 150-210 min/week of walking (10 minutes x 3 meals per day = 30 minutes/day) and mind/body activities (meditation, mindfulness, journaling, etc.) for a minimum of 2-5 minutes daily to reduce cortisol levels.   Beverages: Focus on water, organic tea (loose leave or in paper, avoid plastic sachets), or sparkling/mineral water (ex. Spindrift, Adelina)   No more than 1 cup (8oz) of organic coffee daily prior to noon. May consider organic green tea.   Avoid alcohol   Avoid ALL artificial sweeteners   Prioritize 7-8 hours of restful sleep nightly.   Turn off electronics 1 hour prior to bedtime, no electronics in the  "bedroom.  Establish a regular sleep/wake time (+/- 30 minutes)     Medication and allergy reconciliation completed     Drug Interactions   No significant drug interactions identified    Assessment/Plan   Patients goals:   I want to loose weight so that I can feel healthy  I want my clothes to fit better  Has family history of health problems, wants to avoid.   Discussed patients history, current medical conditions, medications, as well as current diet and lifestyle in depth.   Patient has been making diet/lifestyle changes for: since January  BMI: 32 (163 lbs)  Hysterectomy (2015-16)  Concurrent medical conditions: gestational diabetes 3 out of 4 children, hyperlipidemia (had taken last lipid panel not fasting).   JOHN-IR: 1.6  A1C: 4.6%  BM's daily now with magnesium every other day.   Patient has a history of migraine   Medicaid denied PA for Zepbound, she would like to try Topiramate to address headaches as well as binge eating/weight loss.   Topiramate  Tolerating dose increase well, no side effects  Notices clothes are fitting more loosely.   People are commenting that she has lost weight  -13 lbs  Yearly physical was just done, cholesterol elevated  PCP recommended Mediterranean diet   Paternal history of CVD in 40's  We discussed would like to see triglycerides less than 100  Really likes bread, discussed net carbs, increasing fiber  Cravings have improved  Migraines \"really good\"       CONTINUE  Topiramate 50mg once daily at bedtime    Follow up: 5/20/25 2:40pm     Time spent with pt: Total length of time 15 (minutes) of the encounter and more than 50% was spent counseling the patient.    Milagro Jason, Pharm.D, [facility], [facility]  Clinical Pharmacist  Pharmacy Services  [phone]    Continue all meds under the continuation of care with the referring provider and clinical pharmacy team.    Verbal consent to manage patient's drug therapy was obtained from the patient and/or an individual authorized to act on behalf " of a patient. They were informed they may decline to participate or withdraw from participation in pharmacy services at any time.

## 2025-05-19 NOTE — PROGRESS NOTES
"  Patient ID: Izabela Nunes is a 39 y.o. female who presents for No chief complaint on file..    Referring Provider: Reece Samuel  Pt was referred for weight loss     Preferred Pharmacy:    [id number] - [place], [place] - [address] [place] [address]  [address] [place] [address]  [place] [address]  Phone: [phone] Fax: [phone]    [facility]  [address], [address]  [facility] [address]  Phone: [phone] Fax: [phone]      Subjective      LMP:  Complete hysterectomy 10 years ago    Movement:   Joint pain? Yes, wrists, pelvis, and shoulders - estradiol/testosterone/progesterone compound x 4 years, in 2020 started estradiol patch- patch didn't stick, tried gel - insurance didn't cover, switches to oral in July 2024.   Strength training? Gym 3x/week - walks x 30 mins and weights.   Recommend 2-3x/week for 20 minutes  Osteopenia or osteoporosis: unsure had been told in the past her \"bones were old\"     Stress Level (rate 1-10): 2 out 10    Depression? Not currently, experience post partum     Sleep:   Quantity/Quality/Regularity? Falls to sleep easily with medication, feels progesterone is helpful, but lots of tossing and turning. Fragmented. 10-10:30pm to 5:30am (~6-7 hours)  Daytime brain fog/memory troubles? Yes and ADHD  Tobacco? No, stopped in 2002  Alcohol? No  THC? No    Patient identified goals:   I want to loose weight so that I can feel healthy  I want my clothes to fit better  Has family history of health problems, wants to avoid.     Onset:   Gained 30 lbs in 1 year with exercising and physical job. Not gaining currently but not losing.     Current challenges:   Doesn't have much of an appetite - eats 3 small meals    How long implementing diet/lifestyle change for weight loss:   Since January of 2024    Disordered eating patterns:   None    Weight Loss Drug Therapy Options Screening:     GLP-1 and Metformin:     Pre-Diabetes/Diabetes? Gestation diabetes - 2014  Lab Results   Component " "Value Date    INSULFAST 7 09/12/2024    GLUF 92 09/12/2024    HGBA1C 4.6 09/12/2024       Pertinent PMH Review:   PMH of Pancreatitis: No  PMH of Gallbladder disease: No  PMH of Delayed Gastric Emptying: No  GI issues? No - stomach upset stomach stomach from onions  Frequency of BM? 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 Yes  Patient was on topiramate \"a long while ago\" - she didn't like that should couldn't drink and had less of an appetite.     Adipex:   Anxiety? Yes  Not appropriate with Vyvanse and history of anxiety (currently on buspar).     Thyroid health:   Lab Results   Component Value Date    TSH 0.89 04/16/2025        HRT  Any Contraindications and/or Cautions to HT: Currently on MHT.     Medications potentially contributing to weight gain:   None    History of Gout? No, stopped soda \"a few months back\" - enjoys sparking water  If yes or high fructose diet (soda, hfcs, agave, fruit juice, etc.) order uric acid level.   Uric acid levels above 5.5 mg/dL are linked to increased risk of metabolic diseases.   Fructose metabolism generates uric acid, signaling to the body to increase storage of fat.   Uric acid decreases nitric oxide, constricting blood vessels and impairing insulin function.   High sodium intake can increase uric acid by signaling dehydration, but staying hydrated dilutes sodiums effect.   Elevated levels increase hunger and impulsivity.     Medications tried/stopped for weight management:    None        Concurrent Chronic Medical Conditions:   Cardiovascular Health  The 10-year ASCVD risk score (Prosper SALCIDO, et al., 2019) is: 0.4%    Values used to calculate the score:      Age: 39 years      Sex: Female      Is Non- : No      Diabetic: No      Tobacco smoker: No      Systolic Blood Pressure: 122 mmHg      Is BP " "treated: No      HDL Cholesterol: 71 mg/dL      Total Cholesterol: 231 mg/dL    Lab Results   Component Value Date    CHOL 231 (H) 04/16/2025     Lab Results   Component Value Date    HDL 71 04/16/2025     Lab Results   Component Value Date    LDLCALC 133 (H) 04/16/2025     Lab Results   Component Value Date    TRIG 141 04/16/2025     No components found for: \"CHOLHDL\"   BP Readings from Last 3 Encounters:   03/24/25 122/72   02/19/25 110/70   09/24/24 124/81      Blood Sugar Balance  Lab Results   Component Value Date    GLUCOSE 78 04/16/2025    HGBA1C 4.6 09/12/2024    HGBA1C 4.9 04/23/2021     Lab Results   Component Value Date    INSULFAST 7 09/12/2024    GLUF 92 09/12/2024         Thyroid  Lab Results   Component Value Date    TSH 0.89 04/16/2025     Iron Status  No results found for: \"IRON\", \"TIBC\", \"FERRITIN\"     Kidney Function  Lab Results   Component Value Date    CREATININE 0.74 04/16/2025       Potassium  Lab Results   Component Value Date    K 4.0 04/16/2025        Vitamin D3  Lab Results   Component Value Date    VITD25 25 (L) 05/01/2023       Current Outpatient Medications on File Prior to Visit   Medication Sig Dispense Refill    albuterol-budesonide (Airsupra) 90-80 mcg/actuation inhaler Inhale 2 puffs every 4 hours if needed (SOB, wheezing, and bronchospasms). 32.1 g 0    busPIRone (Buspar) 15 mg tablet Take 1 tablet (15 mg) by mouth 3 times a day.      estradiol (Estrace) 2 mg tablet Take 1 tablet (2 mg) by mouth once daily. 30 tablet 11    fluticasone (Flonase) 50 mcg/actuation nasal spray Administer 1 spray into each nostril once daily. Shake gently. Before first use, prime pump. After use, clean tip and replace cap. 16 g 5    lisdexamfetamine (Vyvanse) 10 MG capsule Take 1 capsule (10 mg) by mouth once daily.      lisdexamfetamine (Vyvanse) 30 mg capsule Take 1 capsule (30 mg) by mouth once daily in the morning.      progesterone (Prometrium) 100 mg capsule Take 1 capsule (100 mg) by mouth once " daily. Take at bedtime 30 capsule 11    topiramate (Topamax) 50 mg tablet Take 1 tablet (50 mg) by mouth 2 times a day. 90 tablet 3    traZODone (Desyrel) 50 mg tablet Take 2 tablets (100 mg) by mouth once daily at bedtime.       No current facility-administered medications on file prior to visit.        Lifestyle and Nourishment Recommendations  Focus on whole foods as close to nature as possible (less than 5 ingredients on the label)  The order of eating matters during each meal, in order to minimize blood sugar spikes  First, 1/3 to 1/2 of meal as colorful vegetables eaten first  Increase fiber 25-30 grams daily (work up slowly to avoid GI distress)  Goal: Daily bowel movement  Second, eat protein and fat as part of your meal  Increase high quality protein to 25-30 grams per meal along with 2-3 x/week resistance training  May consider 3g/day of Creatine (CreaPure) daily mixed in water to support mood, cognition, muscle mass, and bone mass.    Third, have complex carbohydrates/starches as part of your meal  Consider drinking 1 tbsp of apple cider vinegar in a tall glass of water or as a salad dressing with extra virgin olive oil up to 20 minutes prior to or during a meal.   Helps to reduce blood sugar spikes from complex carbohydrates by up to 30%.   Walk or move for 10 minutes after each meal  Goal: 150-210 min/week of walking (10 minutes x 3 meals per day = 30 minutes/day) and mind/body activities (meditation, mindfulness, journaling, etc.) for a minimum of 2-5 minutes daily to reduce cortisol levels.   Beverages: Focus on water, organic tea (loose leave or in paper, avoid plastic sachets), or sparkling/mineral water (ex. Spindrift, [place])   No more than 1 cup (8oz) of organic coffee daily prior to noon. May consider organic green tea.   Avoid alcohol   Avoid ALL artificial sweeteners   Prioritize 7-8 hours of restful sleep nightly.   Turn off electronics 1 hour prior to bedtime, no electronics in the  bedroom.  Establish a regular sleep/wake time (+/- 30 minutes)     Medication and allergy reconciliation completed     Drug Interactions   No significant drug interactions identified    Assessment/Plan   Patients goals:   I want to loose weight so that I can feel healthy  I want my clothes to fit better  Has family history of health problems, wants to avoid.   Discussed patients history, current medical conditions, medications, as well as current diet and lifestyle in depth.   Patient has been making diet/lifestyle changes for: since January  BMI: 32 (163 lbs)  Hysterectomy (2015-16)  Concurrent medical conditions: gestational diabetes 3 out of 4 children, hyperlipidemia (had taken last lipid panel not fasting).   JOHN-IR: 1.6  A1C: 4.6%  BM's daily now with magnesium every other day.   Patient has a history of migraine   Medicaid denied PA for Zepbound, she would like to try Topiramate to address headaches as well as binge eating/weight loss.   Topiramate  Tolerating dose increase well, no side effects  Helping with headaches - has only had 2 migraines since starting which resolve with sleep, hot bath, etc.   No longer needing rescue medication   and coworkers have noticed she has lost weight  Purchased 3 pairs of pants in sizes smaller than what she was previously wearing and they were all loose.   She has been focusing on eating more fruits/vegetables, increasing protein, and obtained recipes from the library for Mediterranean diet that are family friendly.   Goal is to lower cholesterol - wants to avoid heart problems  Sent recommendation for Whey Protein Isolate Pure from [employer]  She would like to try a recipe for protein ice cream      CONTINUE  Topiramate 50mg twice daily    Follow up: 7/23/25 8:20am     Time spent with pt: Total length of time 15 (minutes) of the encounter and more than 50% was spent counseling the patient.    Milagro Jason, Pharm.D, FAB[facility], [facility]  Clinical Pharmacist  Pharmacy  Services  [phone]    Continue all meds under the continuation of care with the referring provider and clinical pharmacy team.    Verbal consent to manage patient's drug therapy was obtained from the patient and/or an individual authorized to act on behalf of a patient. They were informed they may decline to participate or withdraw from participation in pharmacy services at any time.

## 2025-05-20 ENCOUNTER — APPOINTMENT (OUTPATIENT)
Dept: PHARMACY | Facility: HOSPITAL | Age: 40
End: 2025-05-20
Payer: COMMERCIAL

## 2025-05-20 DIAGNOSIS — R63.5 WEIGHT GAIN: ICD-10-CM

## 2025-05-20 DIAGNOSIS — G43.019 REFRACTORY MIGRAINE WITHOUT AURA: ICD-10-CM

## 2025-05-27 PROBLEM — R92.333 HETEROGENEOUSLY DENSE TISSUE OF BOTH BREASTS ON MAMMOGRAPHY: Status: ACTIVE | Noted: 2025-05-27

## 2025-05-27 ASSESSMENT — ENCOUNTER SYMPTOMS
CARDIOVASCULAR NEGATIVE: 1
GASTROINTESTINAL NEGATIVE: 1
HEMATOLOGIC/LYMPHATIC NEGATIVE: 1
CONSTITUTIONAL NEGATIVE: 1
EYES NEGATIVE: 1
PSYCHIATRIC NEGATIVE: 1
MUSCULOSKELETAL NEGATIVE: 1
NEUROLOGICAL NEGATIVE: 1
RESPIRATORY NEGATIVE: 1
ENDOCRINE NEGATIVE: 1

## 2025-05-27 NOTE — PROGRESS NOTES
[facility]  Izabela Nunes female   1985 39 y.o.   [id number]      Chief Complaint  Annual mammogram and exam, high risk surveillance care.    History Of Present Illness  Izabela Nunes is a very pleasant 39 y.o.  female followed in the breast center for high risk surveillance care. She met with genetics and underwent genetic testing which was negative. She has a history of a benign right breast excisional biopsy. She underwent a total hysterectomy at the age of 30 for AUB and was on HRT for 7 years. She has been off of it now for the past 2 years. She does have menopausal symptoms, hot flashes and trouble sleeping. She just saw Reece Samuel CNP and was started on Estradiol. She has family history of breast cancer, see below. She presents today for annual mammogram and exam.     BREAST IMAGIN2024 Full breast MRI, indicates BI-RADS Category 1.  2024 Bilateral diagnostic mammogram with bilateral ultrasound, indicates BI-RADS Category 1.    REPRODUCTIVE HISTORY: menarche age 8, , first birth age 19,  x 47 months, no OCP's, surgical menopause age 30 s/p total hysterectomy d/t AUB, HRT for 7 years and on Estradial now, heterogeneously dense    FAMILY CANCER HISTORY:   Mother: Breast cancer, age 49 (negative BRCA testing)  Maternal Grandmother: Breast cancer, age 54 (negative genetics)  Maternal Aunt: Breast cancer, age 68 (negative BRCA testing)  Maternal Great Aunt: Ovarian and uterine cancer  Maternal Great Grandmother: Cervical cancer    Review of Systems  Review of Systems   Constitutional: Negative.    HENT:  Negative.     Eyes: Negative.    Respiratory: Negative.     Cardiovascular: Negative.    Gastrointestinal: Negative.    Endocrine: Negative.    Genitourinary: Negative.     Musculoskeletal: Negative.    Neurological: Negative.    Hematological: Negative.    Psychiatric/Behavioral: Negative.          Past Medical History  She has a past medical  history of BRCA1 negative and BRCA2 negative.    Surgical History  She has a past surgical history that includes Hysterectomy and Breast biopsy (Right, 2009).     Family History  Cancer-related family history includes Breast cancer (age of onset: 50) in her mother; Breast cancer (age of onset: 58) in her maternal grandmother and mother's sister.     Social History  Social History     Tobacco Use    Smoking status: Former     Types: Cigarettes    Smokeless tobacco: Never   Substance Use Topics    Alcohol use: Not Currently      Allergies  Latex, natural rubber and Nickel    Medications  Current Outpatient Medications   Medication Instructions    busPIRone (BUSPAR) 15 mg, 3 times daily    estradiol (ESTRACE) 2 mg, oral, Daily    fluticasone (Flonase) 50 mcg/actuation nasal spray 1 spray, Each Nostril, Daily, Shake gently. Before first use, prime pump. After use, clean tip and replace cap.    lisdexamfetamine (Vyvanse) 10 MG capsule 1 capsule, Daily    lisdexamfetamine (Vyvanse) 30 mg capsule 1 capsule, Every morning    progesterone (PROMETRIUM) 100 mg, oral, Daily, Take at bedtime    topiramate (TOPAMAX) 50 mg, oral, 2 times daily    traZODone (DESYREL) 100 mg, Nightly     Last Recorded Vitals  There were no vitals taken for this visit.    Physical Exam  Patient is alert and oriented x3 and in a relaxed and appropriate mood. Her gait is steady and hand grasps are equal. Sclera is clear. The breasts are nearly symmetrical. The tissue is soft without palpable abnormalities, discrete nodules or masses. The skin and nipples appear normal. There is no cervical, supraclavicular or axillary lymphadenopathy.     Relevant Imaging  pending    Risk Models    Orders  No orders of the defined types were placed in this encounter.       Visit Diagnosis  1. Breast cancer screening, high risk patient        2. BRCA negative        3. Heterogeneously dense tissue of both breasts on mammography              Assessment/Plan  High risk  surveillance care, normal clinical exam, history benign right excisional biopsy, BRCA negative, family history of breast cancer, heterogeneously dense    Plan:  Return in June 2026 for bilateral screening mammogram and office visit. Due for full breast MRI December 2025. Endocrine therapy not recommended, on estradiol s/p total hysterectomy.    Patient Discussion/Summary  Your clinical examination is normal. You had a screening mammogram today and the results are pending. I will inform you if the screening mammogram is abnormal. Please return in one year for a screening mammogram and office visit or sooner if you have any questions or concerns.       IMPORTANT INFORMATION REGARDING YOUR RESULTS    You can see your health information, review clinical summaries from office visits & test results online when you follow your health with MY  Chart, a personal health record. To sign up go to [email or web address]. If you need assistance with signing up or trouble getting into your account call [employer] Patient Line 24/7 at [phone].    My office phone number is [phone] if you need to get in touch with me or have additional questions or concerns. Thank you for choosing [facility] and trusting me as your healthcare provider. I look forward to seeing you again at your next office visit. I am honored to be a provider on your health care team and I remain dedicated to helping you achieve your health goals.    TABITHA Rodríguez-CNP

## 2025-06-04 ENCOUNTER — APPOINTMENT (OUTPATIENT)
Dept: RADIOLOGY | Facility: CLINIC | Age: 40
End: 2025-06-04
Payer: COMMERCIAL

## 2025-06-04 ENCOUNTER — APPOINTMENT (OUTPATIENT)
Dept: SURGICAL ONCOLOGY | Facility: CLINIC | Age: 40
End: 2025-06-04
Payer: COMMERCIAL

## 2025-06-04 DIAGNOSIS — Z13.71 BRCA NEGATIVE: ICD-10-CM

## 2025-06-04 DIAGNOSIS — Z12.39 BREAST CANCER SCREENING, HIGH RISK PATIENT: Primary | ICD-10-CM

## 2025-06-04 DIAGNOSIS — R92.333 HETEROGENEOUSLY DENSE TISSUE OF BOTH BREASTS ON MAMMOGRAPHY: ICD-10-CM

## 2025-06-18 DIAGNOSIS — R63.5 WEIGHT GAIN: ICD-10-CM

## 2025-06-18 DIAGNOSIS — G43.019 REFRACTORY MIGRAINE WITHOUT AURA: ICD-10-CM

## 2025-06-18 PROCEDURE — RXMED WILLOW AMBULATORY MEDICATION CHARGE

## 2025-06-18 RX ORDER — TOPIRAMATE 50 MG/1
50 TABLET, FILM COATED ORAL 2 TIMES DAILY
Qty: 90 TABLET | Refills: 3 | Status: SHIPPED | OUTPATIENT
Start: 2025-06-18

## 2025-06-19 ENCOUNTER — PHARMACY VISIT (OUTPATIENT)
Dept: PHARMACY | Facility: CLINIC | Age: 40
End: 2025-06-19
Payer: MEDICAID

## 2025-06-24 ENCOUNTER — HOSPITAL ENCOUNTER (OUTPATIENT)
Dept: RADIOLOGY | Facility: HOSPITAL | Age: 40
Discharge: HOME | End: 2025-06-24
Payer: COMMERCIAL

## 2025-06-24 VITALS — BODY MASS INDEX: 29.45 KG/M2 | WEIGHT: 150 LBS | HEIGHT: 60 IN

## 2025-06-24 DIAGNOSIS — Z12.39 BREAST CANCER SCREENING, HIGH RISK PATIENT: ICD-10-CM

## 2025-06-24 DIAGNOSIS — R92.30 DENSE BREAST TISSUE: ICD-10-CM

## 2025-06-24 DIAGNOSIS — Z12.31 ENCOUNTER FOR SCREENING MAMMOGRAM FOR MALIGNANT NEOPLASM OF BREAST: ICD-10-CM

## 2025-06-24 PROCEDURE — 77063 BREAST TOMOSYNTHESIS BI: CPT | Performed by: RADIOLOGY

## 2025-06-24 PROCEDURE — 77063 BREAST TOMOSYNTHESIS BI: CPT

## 2025-06-24 PROCEDURE — 77067 SCR MAMMO BI INCL CAD: CPT | Performed by: RADIOLOGY

## 2025-07-13 DIAGNOSIS — N95.1 MENOPAUSAL SYNDROME ON HORMONE REPLACEMENT THERAPY: ICD-10-CM

## 2025-07-13 DIAGNOSIS — Z79.890 MENOPAUSAL SYNDROME ON HORMONE REPLACEMENT THERAPY: ICD-10-CM

## 2025-07-17 ENCOUNTER — TELEPHONE (OUTPATIENT)
Dept: OBSTETRICS AND GYNECOLOGY | Facility: CLINIC | Age: 40
End: 2025-07-17
Payer: COMMERCIAL

## 2025-07-17 RX ORDER — ESTRADIOL 2 MG/1
2 TABLET ORAL DAILY
Qty: 30 TABLET | Refills: 0 | OUTPATIENT
Start: 2025-07-17

## 2025-07-23 ENCOUNTER — APPOINTMENT (OUTPATIENT)
Dept: PHARMACY | Facility: HOSPITAL | Age: 40
End: 2025-07-23
Payer: COMMERCIAL

## 2025-07-23 DIAGNOSIS — R63.5 WEIGHT GAIN: Primary | ICD-10-CM

## 2025-07-23 DIAGNOSIS — G43.019 REFRACTORY MIGRAINE WITHOUT AURA: ICD-10-CM

## 2025-07-23 RX ORDER — ACETAMINOPHEN 325 MG/1
650 TABLET ORAL EVERY 6 HOURS PRN
COMMUNITY

## 2025-07-23 NOTE — PROGRESS NOTES
"  Patient ID: Izabela Nunes is a 39 y.o. female who presents for No chief complaint on file..    Referring Provider: Reece Samuel  Pt was referred for weight loss     Preferred Pharmacy:    [id number] - [place], [place] - [address] [place] [address]  [address] [place] [address]  [place] [address]  Phone: [phone] Fax: [phone]    [facility]  [address], [address]  [facility] [address]  Phone: [phone] Fax: [phone]      Subjective      LMP:  Complete hysterectomy 10 years ago    Movement:   Joint pain? Yes, wrists, pelvis, and shoulders - estradiol/testosterone/progesterone compound x 4 years, in 2020 started estradiol patch- patch didn't stick, tried gel - insurance didn't cover, switches to oral in July 2024.   Strength training? Gym 3x/week - walks x 30 mins and weights.   Recommend 2-3x/week for 20 minutes  Osteopenia or osteoporosis: unsure had been told in the past her \"bones were old\"     Stress Level (rate 1-10): 2 out 10    Depression? Not currently, experience post partum     Sleep:   Quantity/Quality/Regularity? Falls to sleep easily with medication, feels progesterone is helpful, but lots of tossing and turning. Fragmented. 10-10:30pm to 5:30am (~6-7 hours)  Daytime brain fog/memory troubles? Yes and ADHD  Tobacco? No, stopped in 2002  Alcohol? No  THC? No    Patient identified goals:   I want to loose weight so that I can feel healthy  I want my clothes to fit better  Has family history of health problems, wants to avoid.     Onset:   Gained 30 lbs in 1 year with exercising and physical job. Not gaining currently but not losing.     Current challenges:   Doesn't have much of an appetite - eats 3 small meals    How long implementing diet/lifestyle change for weight loss:   Since January of 2024    Disordered eating patterns:   None    Weight Loss Drug Therapy Options Screening:     GLP-1 and Metformin:     Pre-Diabetes/Diabetes? Gestation diabetes - 2014  Lab Results   Component " "Value Date    INSULFAST 7 09/12/2024    GLUF 92 09/12/2024    HGBA1C 4.6 09/12/2024       Pertinent PMH Review:   PMH of Pancreatitis: No  PMH of Gallbladder disease: No  PMH of Delayed Gastric Emptying: No  GI issues? No - stomach upset stomach stomach from onions  Frequency of BM? 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 Yes  Patient was on topiramate \"a long while ago\" - she didn't like that should couldn't drink and had less of an appetite.     Adipex:   Anxiety? Yes  Not appropriate with Vyvanse and history of anxiety (currently on buspar).     Thyroid health:   Lab Results   Component Value Date    TSH 0.89 04/16/2025        HRT  Any Contraindications and/or Cautions to HT: Currently on MHT.     Medications potentially contributing to weight gain:   None    History of Gout? No, stopped soda \"a few months back\" - enjoys sparking water  If yes or high fructose diet (soda, hfcs, agave, fruit juice, etc.) order uric acid level.   Uric acid levels above 5.5 mg/dL are linked to increased risk of metabolic diseases.   Fructose metabolism generates uric acid, signaling to the body to increase storage of fat.   Uric acid decreases nitric oxide, constricting blood vessels and impairing insulin function.   High sodium intake can increase uric acid by signaling dehydration, but staying hydrated dilutes sodiums effect.   Elevated levels increase hunger and impulsivity.     Medications tried/stopped for weight management:    None        Concurrent Chronic Medical Conditions:   Cardiovascular Health  The 10-year ASCVD risk score (Prosper SALCIDO, et al., 2019) is: 0.4%    Values used to calculate the score:      Age: 39 years      Clincally relevant sex: Female      Is Non- : No      Diabetic: No      Tobacco smoker: No      Systolic Blood Pressure: 122 " "mmHg      Is BP treated: No      HDL Cholesterol: 71 mg/dL      Total Cholesterol: 231 mg/dL    Lab Results   Component Value Date    CHOL 231 (H) 04/16/2025     Lab Results   Component Value Date    HDL 71 04/16/2025     Lab Results   Component Value Date    LDLCALC 133 (H) 04/16/2025     Lab Results   Component Value Date    TRIG 141 04/16/2025     No components found for: \"CHOLHDL\"   BP Readings from Last 3 Encounters:   03/24/25 122/72   02/19/25 110/70   09/24/24 124/81      Blood Sugar Balance  Lab Results   Component Value Date    GLUCOSE 78 04/16/2025    HGBA1C 4.6 09/12/2024    HGBA1C 4.9 04/23/2021     Lab Results   Component Value Date    INSULFAST 7 09/12/2024    GLUF 92 09/12/2024         Thyroid  Lab Results   Component Value Date    TSH 0.89 04/16/2025     Iron Status  No results found for: \"IRON\", \"TIBC\", \"FERRITIN\"     Kidney Function  Lab Results   Component Value Date    CREATININE 0.74 04/16/2025       Potassium  Lab Results   Component Value Date    K 4.0 04/16/2025        Vitamin D3  Lab Results   Component Value Date    VITD25 25 (L) 05/01/2023       Current Outpatient Medications on File Prior to Visit   Medication Sig Dispense Refill    busPIRone (Buspar) 15 mg tablet Take 1 tablet (15 mg) by mouth 3 times a day.      estradiol (Estrace) 2 mg tablet Take 1 tablet (2 mg) by mouth once daily. 90 tablet 0    fluticasone (Flonase) 50 mcg/actuation nasal spray Administer 1 spray into each nostril once daily. Shake gently. Before first use, prime pump. After use, clean tip and replace cap. 16 g 5    lisdexamfetamine (Vyvanse) 10 MG capsule Take 1 capsule (10 mg) by mouth once daily.      lisdexamfetamine (Vyvanse) 30 mg capsule Take 1 capsule (30 mg) by mouth once daily in the morning.      progesterone (Prometrium) 100 mg capsule Take 1 capsule (100 mg) by mouth once daily. Take at bedtime 90 capsule 0    topiramate (Topamax) 50 mg tablet Take 1 tablet (50 mg) by mouth 2 times a day. 90 tablet 3 "    traZODone (Desyrel) 50 mg tablet Take 2 tablets (100 mg) by mouth once daily at bedtime.      [DISCONTINUED] estradiol (Estrace) 2 mg tablet Take 1 tablet (2 mg) by mouth once daily. 30 tablet 11    [DISCONTINUED] progesterone (Prometrium) 100 mg capsule Take 1 capsule (100 mg) by mouth once daily. Take at bedtime 30 capsule 11     No current facility-administered medications on file prior to visit.        Lifestyle and Nourishment Recommendations  Focus on whole foods as close to nature as possible (less than 5 ingredients on the label)  The order of eating matters during each meal, in order to minimize blood sugar spikes  First, 1/3 to 1/2 of meal as colorful vegetables eaten first  Increase fiber 25-30 grams daily (work up slowly to avoid GI distress)  Goal: Daily bowel movement  Second, eat protein and fat as part of your meal  Increase high quality protein to 25-30 grams per meal along with 2-3 x/week resistance training  May consider 3g/day of Creatine (CreaPure) daily mixed in water to support mood, cognition, muscle mass, and bone mass.    Third, have complex carbohydrates/starches as part of your meal  Consider drinking 1 tbsp of apple cider vinegar in a tall glass of water or as a salad dressing with extra virgin olive oil up to 20 minutes prior to or during a meal.   Helps to reduce blood sugar spikes from complex carbohydrates by up to 30%.   Walk or move for 10 minutes after each meal  Goal: 150-210 min/week of walking (10 minutes x 3 meals per day = 30 minutes/day) and mind/body activities (meditation, mindfulness, journaling, etc.) for a minimum of 2-5 minutes daily to reduce cortisol levels.   Beverages: Focus on water, organic tea (loose leave or in paper, avoid plastic sachets), or sparkling/mineral water (ex. [place], [place])   No more than 1 cup (8oz) of organic coffee daily prior to noon. May consider organic green tea.   Avoid alcohol   Avoid ALL artificial sweeteners   Prioritize 7-8  hours of restful sleep nightly.   Turn off electronics 1 hour prior to bedtime, no electronics in the bedroom.  Establish a regular sleep/wake time (+/- 30 minutes)     Medication and allergy reconciliation completed     Drug Interactions   No significant drug interactions identified    Assessment/Plan   Patients goals:   I want to loose weight so that I can feel healthy  I want my clothes to fit better  Has family history of health problems, wants to avoid.   Discussed patients history, current medical conditions, medications, as well as current diet and lifestyle in depth.   Patient has been making diet/lifestyle changes for: since January  BMI: 32 (163 lbs)  Hysterectomy (2015-16)  Concurrent medical conditions: gestational diabetes 3 out of 4 children, hyperlipidemia (had taken last lipid panel not fasting).   JOHN-IR: 1.6  A1C: 4.6%  BM's daily now with magnesium every other day.   Patient has a history of migraine   Medicaid denied PA for Zepbound, she would like to try Topiramate to address headaches as well as binge eating/weight loss.   Topiramate  Tolerating dose increase well, no side effects  Occasional weather related headaches, reduced overall  No longer needing rescue medication  Weight loss  136.6 lbs  She has been focusing on eating more fruits/vegetables, increasing protein, and obtained recipes from the library for Mediterranean diet that are family friendly.   Goal is to lower cholesterol - wants to avoid heart problems      CONTINUE  Topiramate 50mg twice daily    Follow up: 1/21/25 10am     Time spent with pt: Total length of time 15 (minutes) of the encounter and more than 50% was spent counseling the patient.    Milagro Jason, Pharm.D, [facility], [facility]  Clinical Pharmacist  Pharmacy Services  [phone]    Continue all meds under the continuation of care with the referring provider and clinical pharmacy team.    Verbal consent to manage patient's drug therapy was obtained from the patient and/or  an individual authorized to act on behalf of a patient. They were informed they may decline to participate or withdraw from participation in pharmacy services at any time.

## 2025-08-09 PROCEDURE — RXMED WILLOW AMBULATORY MEDICATION CHARGE

## 2025-08-11 ENCOUNTER — OFFICE VISIT (OUTPATIENT)
Dept: SURGICAL ONCOLOGY | Facility: HOSPITAL | Age: 40
End: 2025-08-11
Payer: COMMERCIAL

## 2025-08-11 VITALS
TEMPERATURE: 97 F | SYSTOLIC BLOOD PRESSURE: 121 MMHG | OXYGEN SATURATION: 98 % | WEIGHT: 140 LBS | BODY MASS INDEX: 27.34 KG/M2 | RESPIRATION RATE: 16 BRPM | HEART RATE: 76 BPM | DIASTOLIC BLOOD PRESSURE: 69 MMHG

## 2025-08-11 DIAGNOSIS — Z13.71 BRCA NEGATIVE: ICD-10-CM

## 2025-08-11 DIAGNOSIS — R92.333 HETEROGENEOUSLY DENSE TISSUE OF BOTH BREASTS ON MAMMOGRAPHY: ICD-10-CM

## 2025-08-11 DIAGNOSIS — Z12.39 BREAST CANCER SCREENING, HIGH RISK PATIENT: Primary | ICD-10-CM

## 2025-08-11 PROCEDURE — 99214 OFFICE O/P EST MOD 30 MIN: CPT | Performed by: NURSE PRACTITIONER

## 2025-08-11 ASSESSMENT — ENCOUNTER SYMPTOMS
CARDIOVASCULAR NEGATIVE: 1
PSYCHIATRIC NEGATIVE: 1
GASTROINTESTINAL NEGATIVE: 1
HEMATOLOGIC/LYMPHATIC NEGATIVE: 1
ENDOCRINE NEGATIVE: 1
EYES NEGATIVE: 1
MUSCULOSKELETAL NEGATIVE: 1
CONSTITUTIONAL NEGATIVE: 1
NEUROLOGICAL NEGATIVE: 1
RESPIRATORY NEGATIVE: 1

## 2025-08-13 ENCOUNTER — PHARMACY VISIT (OUTPATIENT)
Dept: PHARMACY | Facility: CLINIC | Age: 40
End: 2025-08-13
Payer: MEDICAID

## 2025-12-05 ENCOUNTER — APPOINTMENT (OUTPATIENT)
Dept: OBSTETRICS AND GYNECOLOGY | Facility: CLINIC | Age: 40
End: 2025-12-05
Payer: COMMERCIAL

## 2026-01-21 ENCOUNTER — APPOINTMENT (OUTPATIENT)
Dept: PHARMACY | Facility: HOSPITAL | Age: 41
End: 2026-01-21
Payer: COMMERCIAL